# Patient Record
Sex: MALE | Race: WHITE | Employment: FULL TIME | URBAN - METROPOLITAN AREA
[De-identification: names, ages, dates, MRNs, and addresses within clinical notes are randomized per-mention and may not be internally consistent; named-entity substitution may affect disease eponyms.]

---

## 2019-08-10 ENCOUNTER — HOSPITAL ENCOUNTER (INPATIENT)
Age: 29
LOS: 6 days | Discharge: HOME | DRG: 710 | End: 2019-08-16
Attending: EMERGENCY MEDICINE | Admitting: INTERNAL MEDICINE
Payer: MEDICARE

## 2019-08-10 ENCOUNTER — APPOINTMENT (OUTPATIENT)
Dept: CT IMAGING | Age: 29
DRG: 710 | End: 2019-08-10
Payer: MEDICARE

## 2019-08-10 ENCOUNTER — APPOINTMENT (OUTPATIENT)
Dept: GENERAL RADIOLOGY | Age: 29
DRG: 710 | End: 2019-08-10
Payer: MEDICARE

## 2019-08-10 DIAGNOSIS — L03.114 CELLULITIS OF LEFT UPPER EXTREMITY: Primary | ICD-10-CM

## 2019-08-10 PROBLEM — L02.519 HAND ABSCESS: Status: ACTIVE | Noted: 2019-08-10

## 2019-08-10 LAB
A/G RATIO: 0.9 (ref 1.1–2.2)
ALBUMIN SERPL-MCNC: 4.1 G/DL (ref 3.4–5)
ALP BLD-CCNC: 52 U/L (ref 40–129)
ALT SERPL-CCNC: 73 U/L (ref 10–40)
ANION GAP SERPL CALCULATED.3IONS-SCNC: 15 MMOL/L (ref 3–16)
AST SERPL-CCNC: 155 U/L (ref 15–37)
BASOPHILS ABSOLUTE: 0 K/UL (ref 0–0.2)
BASOPHILS RELATIVE PERCENT: 0.3 %
BILIRUB SERPL-MCNC: 0.9 MG/DL (ref 0–1)
BUN BLDV-MCNC: 8 MG/DL (ref 7–20)
C-REACTIVE PROTEIN: 56.7 MG/L (ref 0–5.1)
CALCIUM SERPL-MCNC: 9 MG/DL (ref 8.3–10.6)
CHLORIDE BLD-SCNC: 93 MMOL/L (ref 99–110)
CO2: 26 MMOL/L (ref 21–32)
CREAT SERPL-MCNC: 0.8 MG/DL (ref 0.9–1.3)
EOSINOPHILS ABSOLUTE: 0 K/UL (ref 0–0.6)
EOSINOPHILS RELATIVE PERCENT: 0.1 %
GFR AFRICAN AMERICAN: >60
GFR NON-AFRICAN AMERICAN: >60
GLOBULIN: 4.4 G/DL
GLUCOSE BLD-MCNC: 95 MG/DL (ref 70–99)
HCT VFR BLD CALC: 40.7 % (ref 40.5–52.5)
HEMOGLOBIN: 13.7 G/DL (ref 13.5–17.5)
LACTIC ACID: 1.7 MMOL/L (ref 0.4–2)
LYMPHOCYTES ABSOLUTE: 0.9 K/UL (ref 1–5.1)
LYMPHOCYTES RELATIVE PERCENT: 13.4 %
MCH RBC QN AUTO: 28.7 PG (ref 26–34)
MCHC RBC AUTO-ENTMCNC: 33.7 G/DL (ref 31–36)
MCV RBC AUTO: 85.1 FL (ref 80–100)
MONOCYTES ABSOLUTE: 0.7 K/UL (ref 0–1.3)
MONOCYTES RELATIVE PERCENT: 10.4 %
NEUTROPHILS ABSOLUTE: 5.1 K/UL (ref 1.7–7.7)
NEUTROPHILS RELATIVE PERCENT: 75.8 %
PDW BLD-RTO: 14.2 % (ref 12.4–15.4)
PLATELET # BLD: 163 K/UL (ref 135–450)
PMV BLD AUTO: 9.4 FL (ref 5–10.5)
POTASSIUM REFLEX MAGNESIUM: 4 MMOL/L (ref 3.5–5.1)
RBC # BLD: 4.79 M/UL (ref 4.2–5.9)
SEDIMENTATION RATE, ERYTHROCYTE: 20 MM/HR (ref 0–15)
SODIUM BLD-SCNC: 134 MMOL/L (ref 136–145)
TOTAL PROTEIN: 8.5 G/DL (ref 6.4–8.2)
TROPONIN: <0.01 NG/ML
WBC # BLD: 6.7 K/UL (ref 4–11)

## 2019-08-10 PROCEDURE — 6360000002 HC RX W HCPCS: Performed by: INTERNAL MEDICINE

## 2019-08-10 PROCEDURE — 73200 CT UPPER EXTREMITY W/O DYE: CPT

## 2019-08-10 PROCEDURE — 87040 BLOOD CULTURE FOR BACTERIA: CPT

## 2019-08-10 PROCEDURE — 2580000003 HC RX 258: Performed by: INTERNAL MEDICINE

## 2019-08-10 PROCEDURE — 87801 DETECT AGNT MULT DNA AMPLI: CPT

## 2019-08-10 PROCEDURE — 2580000003 HC RX 258: Performed by: EMERGENCY MEDICINE

## 2019-08-10 PROCEDURE — 6370000000 HC RX 637 (ALT 250 FOR IP): Performed by: ORTHOPAEDIC SURGERY

## 2019-08-10 PROCEDURE — 96375 TX/PRO/DX INJ NEW DRUG ADDON: CPT

## 2019-08-10 PROCEDURE — 83605 ASSAY OF LACTIC ACID: CPT

## 2019-08-10 PROCEDURE — 86140 C-REACTIVE PROTEIN: CPT

## 2019-08-10 PROCEDURE — 6360000002 HC RX W HCPCS: Performed by: EMERGENCY MEDICINE

## 2019-08-10 PROCEDURE — 96372 THER/PROPH/DIAG INJ SC/IM: CPT

## 2019-08-10 PROCEDURE — 99284 EMERGENCY DEPT VISIT MOD MDM: CPT

## 2019-08-10 PROCEDURE — 80053 COMPREHEN METABOLIC PANEL: CPT

## 2019-08-10 PROCEDURE — 85025 COMPLETE CBC W/AUTO DIFF WBC: CPT

## 2019-08-10 PROCEDURE — 1200000000 HC SEMI PRIVATE

## 2019-08-10 PROCEDURE — 87641 MR-STAPH DNA AMP PROBE: CPT

## 2019-08-10 PROCEDURE — 99255 IP/OBS CONSLTJ NEW/EST HI 80: CPT | Performed by: INTERNAL MEDICINE

## 2019-08-10 PROCEDURE — 85652 RBC SED RATE AUTOMATED: CPT

## 2019-08-10 PROCEDURE — 6370000000 HC RX 637 (ALT 250 FOR IP): Performed by: EMERGENCY MEDICINE

## 2019-08-10 PROCEDURE — 0J9K0ZZ DRAINAGE OF LEFT HAND SUBCUTANEOUS TISSUE AND FASCIA, OPEN APPROACH: ICD-10-PCS | Performed by: ORTHOPAEDIC SURGERY

## 2019-08-10 PROCEDURE — 84484 ASSAY OF TROPONIN QUANT: CPT

## 2019-08-10 PROCEDURE — 96361 HYDRATE IV INFUSION ADD-ON: CPT

## 2019-08-10 PROCEDURE — 73130 X-RAY EXAM OF HAND: CPT

## 2019-08-10 PROCEDURE — 6360000002 HC RX W HCPCS: Performed by: ORTHOPAEDIC SURGERY

## 2019-08-10 PROCEDURE — 71046 X-RAY EXAM CHEST 2 VIEWS: CPT

## 2019-08-10 PROCEDURE — 36415 COLL VENOUS BLD VENIPUNCTURE: CPT

## 2019-08-10 PROCEDURE — 96365 THER/PROPH/DIAG IV INF INIT: CPT

## 2019-08-10 RX ORDER — BUPRENORPHINE HYDROCHLORIDE AND NALOXONE HYDROCHLORIDE DIHYDRATE 8; 2 MG/1; MG/1
1 TABLET SUBLINGUAL DAILY
COMMUNITY

## 2019-08-10 RX ORDER — BUPRENORPHINE HYDROCHLORIDE AND NALOXONE HYDROCHLORIDE DIHYDRATE 8; 2 MG/1; MG/1
1 TABLET SUBLINGUAL DAILY
Status: DISCONTINUED | OUTPATIENT
Start: 2019-08-10 | End: 2019-08-10

## 2019-08-10 RX ORDER — IBUPROFEN 600 MG/1
600 TABLET ORAL ONCE
Status: DISCONTINUED | OUTPATIENT
Start: 2019-08-10 | End: 2019-08-11 | Stop reason: ALTCHOICE

## 2019-08-10 RX ORDER — ONDANSETRON 2 MG/ML
4 INJECTION INTRAMUSCULAR; INTRAVENOUS EVERY 6 HOURS PRN
Status: DISCONTINUED | OUTPATIENT
Start: 2019-08-10 | End: 2019-08-11

## 2019-08-10 RX ORDER — SODIUM CHLORIDE 0.9 % (FLUSH) 0.9 %
10 SYRINGE (ML) INJECTION EVERY 12 HOURS SCHEDULED
Status: DISCONTINUED | OUTPATIENT
Start: 2019-08-10 | End: 2019-08-16 | Stop reason: HOSPADM

## 2019-08-10 RX ORDER — SODIUM CHLORIDE 0.9 % (FLUSH) 0.9 %
10 SYRINGE (ML) INJECTION PRN
Status: DISCONTINUED | OUTPATIENT
Start: 2019-08-10 | End: 2019-08-16 | Stop reason: HOSPADM

## 2019-08-10 RX ORDER — 0.9 % SODIUM CHLORIDE 0.9 %
1000 INTRAVENOUS SOLUTION INTRAVENOUS ONCE
Status: COMPLETED | OUTPATIENT
Start: 2019-08-10 | End: 2019-08-10

## 2019-08-10 RX ORDER — 0.9 % SODIUM CHLORIDE 0.9 %
1000 INTRAVENOUS SOLUTION INTRAVENOUS ONCE
Status: DISCONTINUED | OUTPATIENT
Start: 2019-08-10 | End: 2019-08-11

## 2019-08-10 RX ORDER — SODIUM CHLORIDE 9 MG/ML
INJECTION, SOLUTION INTRAVENOUS CONTINUOUS
Status: DISCONTINUED | OUTPATIENT
Start: 2019-08-10 | End: 2019-08-13

## 2019-08-10 RX ORDER — KETOROLAC TROMETHAMINE 30 MG/ML
30 INJECTION, SOLUTION INTRAMUSCULAR; INTRAVENOUS EVERY 6 HOURS PRN
Status: DISPENSED | OUTPATIENT
Start: 2019-08-10 | End: 2019-08-15

## 2019-08-10 RX ORDER — NALOXONE HYDROCHLORIDE 1 MG/ML
1 INJECTION INTRAMUSCULAR; INTRAVENOUS; SUBCUTANEOUS PRN
Status: DISCONTINUED | OUTPATIENT
Start: 2019-08-10 | End: 2019-08-11 | Stop reason: ALTCHOICE

## 2019-08-10 RX ORDER — ONDANSETRON 2 MG/ML
4 INJECTION INTRAMUSCULAR; INTRAVENOUS ONCE
Status: DISCONTINUED | OUTPATIENT
Start: 2019-08-10 | End: 2019-08-11 | Stop reason: ALTCHOICE

## 2019-08-10 RX ORDER — CHLORHEXIDINE GLUCONATE 4 G/100ML
SOLUTION TOPICAL ONCE
Status: COMPLETED | OUTPATIENT
Start: 2019-08-10 | End: 2019-08-10

## 2019-08-10 RX ORDER — SODIUM CHLORIDE 9 MG/ML
INJECTION, SOLUTION INTRAVENOUS CONTINUOUS
Status: DISCONTINUED | OUTPATIENT
Start: 2019-08-11 | End: 2019-08-10

## 2019-08-10 RX ORDER — BUPRENORPHINE AND NALOXONE 8; 2 MG/1; MG/1
1 FILM, SOLUBLE BUCCAL; SUBLINGUAL DAILY
Status: DISCONTINUED | OUTPATIENT
Start: 2019-08-10 | End: 2019-08-16 | Stop reason: HOSPADM

## 2019-08-10 RX ORDER — IBUPROFEN 600 MG/1
600 TABLET ORAL ONCE
Status: COMPLETED | OUTPATIENT
Start: 2019-08-10 | End: 2019-08-10

## 2019-08-10 RX ADMIN — CEFEPIME HYDROCHLORIDE 2 G: 2 INJECTION, POWDER, FOR SOLUTION INTRAVENOUS at 13:05

## 2019-08-10 RX ADMIN — KETOROLAC TROMETHAMINE 30 MG: 30 INJECTION, SOLUTION INTRAMUSCULAR at 22:19

## 2019-08-10 RX ADMIN — VANCOMYCIN HYDROCHLORIDE 1000 MG: 1 INJECTION, POWDER, LYOPHILIZED, FOR SOLUTION INTRAVENOUS at 16:45

## 2019-08-10 RX ADMIN — PIPERACILLIN SODIUM,TAZOBACTAM SODIUM 3.38 G: 3; .375 INJECTION, POWDER, FOR SOLUTION INTRAVENOUS at 05:12

## 2019-08-10 RX ADMIN — IBUPROFEN 600 MG: 600 TABLET ORAL at 02:38

## 2019-08-10 RX ADMIN — BUPRENORPHINE HYDROCHLORIDE, NALOXONE HYDROCHLORIDE 1 FILM: 8; 2 FILM, SOLUBLE BUCCAL; SUBLINGUAL at 14:09

## 2019-08-10 RX ADMIN — SODIUM CHLORIDE: 9 INJECTION, SOLUTION INTRAVENOUS at 10:30

## 2019-08-10 RX ADMIN — VANCOMYCIN HYDROCHLORIDE 1000 MG: 1 INJECTION, POWDER, LYOPHILIZED, FOR SOLUTION INTRAVENOUS at 05:44

## 2019-08-10 RX ADMIN — ANTISEPTIC SURGICAL SCRUB: 0.04 SOLUTION TOPICAL at 12:54

## 2019-08-10 RX ADMIN — SODIUM CHLORIDE 1000 ML: 9 INJECTION, SOLUTION INTRAVENOUS at 04:03

## 2019-08-10 RX ADMIN — NALOXONE HYDROCHLORIDE 1 MG: 1 INJECTION PARENTERAL at 03:30

## 2019-08-10 RX ADMIN — AMPICILLIN AND SULBACTAM 3 G: 1; .5 INJECTION, POWDER, FOR SOLUTION INTRAMUSCULAR; INTRAVENOUS at 09:51

## 2019-08-10 ASSESSMENT — ENCOUNTER SYMPTOMS
SORE THROAT: 0
ABDOMINAL PAIN: 0
NAUSEA: 0
CONSTIPATION: 0
WHEEZING: 0
BACK PAIN: 0
TROUBLE SWALLOWING: 0
DIARRHEA: 0
EYE REDNESS: 0
SHORTNESS OF BREATH: 0
COUGH: 0
RHINORRHEA: 0
EYE DISCHARGE: 0

## 2019-08-10 ASSESSMENT — PAIN DESCRIPTION - PAIN TYPE: TYPE: ACUTE PAIN

## 2019-08-10 ASSESSMENT — PAIN SCALES - GENERAL
PAINLEVEL_OUTOF10: 0
PAINLEVEL_OUTOF10: 8
PAINLEVEL_OUTOF10: 7
PAINLEVEL_OUTOF10: 8

## 2019-08-10 ASSESSMENT — PAIN DESCRIPTION - DESCRIPTORS: DESCRIPTORS: ACHING

## 2019-08-10 ASSESSMENT — PAIN DESCRIPTION - LOCATION: LOCATION: HAND

## 2019-08-10 ASSESSMENT — PAIN DESCRIPTION - ORIENTATION: ORIENTATION: LEFT

## 2019-08-10 NOTE — PROGRESS NOTES
Consent for left hand abscess I&D signed by patient and placed in chart. Educated on NPO status at midnight.   Electronically signed by Ryanne Lange RN on 8/10/2019 at 4:23 PM
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
## 2019-08-10 NOTE — PROGRESS NOTES
In to assess pt. Very drowsy, arouses to name but falls right back asleep. VSS. Does not appear to be in any distress. Left hand swollen, no active drainage from \"spider bite\". Warm to touch, normal radial pulse, brisk cap refill, normal sensation. Rt ac IV WDL. Call light within reach, will continue to monitor.   Electronically signed by Doreen Moreland RN on 8/10/2019 at 10:55 AM

## 2019-08-10 NOTE — PROGRESS NOTES
COWS assessment negative this AM. Pt sleeping quietly in bed all day but wakes to name. No signs or symptoms of withdrawal. Will continue to monitor.   Electronically signed by Bhavani Moncada RN on 8/10/2019 at 6:55 PM

## 2019-08-10 NOTE — ED PROVIDER NOTES
Occupational History    None   Social Needs    Financial resource strain: None    Food insecurity:     Worry: None     Inability: None    Transportation needs:     Medical: None     Non-medical: None   Tobacco Use    Smoking status: Current Every Day Smoker     Packs/day: 0.50     Years: 5.00     Pack years: 2.50     Types: Cigarettes   Substance and Sexual Activity    Alcohol use: Yes     Alcohol/week: 1.0 standard drinks     Types: 1 Shots of liquor per week    Drug use: Yes    Sexual activity: Yes   Lifestyle    Physical activity:     Days per week: None     Minutes per session: None    Stress: None   Relationships    Social connections:     Talks on phone: None     Gets together: None     Attends Scientologist service: None     Active member of club or organization: None     Attends meetings of clubs or organizations: None     Relationship status: None    Intimate partner violence:     Fear of current or ex partner: None     Emotionally abused: None     Physically abused: None     Forced sexual activity: None   Other Topics Concern    None   Social History Narrative    None       SCREENINGS      @FLOW([id number])@      PHYSICAL EXAM    (up to 7 for level 4, 8 or more for level 5)     ED Triage Vitals [08/10/19 0206]   BP Temp Temp src Pulse Resp SpO2 Height Weight   (!) 140/72 101.3 °F (38.5 °C) -- 94 18 98 % -- 150 lb (68 kg)       Physical Exam   Constitutional: He is oriented to person, place, and time. He appears well-developed and well-nourished. No distress. HENT:   Head: Normocephalic and atraumatic. Eyes: EOM are normal. No scleral icterus. Neck: Normal range of motion. No tracheal deviation present. Pulmonary/Chest: Effort normal. No respiratory distress. Abdominal: Soft. He exhibits no distension. Musculoskeletal: He exhibits tenderness. He exhibits no edema or deformity. Abscess to left dorsum of hand. Neurological: He is alert and oriented to person, place, and time.
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
## 2019-08-10 NOTE — CONSULTS
infusion, , Intravenous, Continuous  ketorolac (TORADOL) injection 30 mg, 30 mg, Intravenous, Q6H PRN  Allergies:  Tylenol [acetaminophen]    Social History:   TOBACCO:   reports that he has been smoking cigarettes. He has a 2.50 pack-year smoking history. He does not have any smokeless tobacco history on file. ETOH:   reports that he drinks about 1.0 standard drinks of alcohol per week. DRUGS:   reports that he has current or past drug history. Family History:   History reviewed. No pertinent family history. PHYSICAL EXAM:    VITALS:  /78   Pulse 88   Temp 98.6 °F (37 °C) (Oral)   Resp 16   Wt 150 lb (68 kg)   SpO2 96%   BMI 24.21 kg/m²   24HR INTAKE/OUTPUT:  No intake or output data in the 24 hours ending 08/10/19 1135  CONSTITUTIONAL:  Awakens briefly to voice, no acute distress. Only answers \"uh-huh\" to questions. Goes right back to sleep  ENT:  normocepalic, without obvious abnormality, atraumatic  LUNGS:  no increased work of breathing  Left hand: swollen and painful with attempt at finger / wrist ROM. Pustule noted over 2rd MC area dorsally, mild lymphangitic streaking proximal to the wrist.  Area is warm to touch. Capillary refill < 2 seconds. Media Information      Document Information     Wound   Left hand   08/10/2019 11:44   Attached To:    Hospital Encounter on 8/10/19   Source Information     Brandi Salinas MD  [facility]         DATA:    CBC:   Lab Results   Component Value Date    WBC 6.7 08/10/2019    RBC 4.79 08/10/2019    HGB 13.7 08/10/2019    HCT 40.7 08/10/2019    MCV 85.1 08/10/2019    MCH 28.7 08/10/2019    MCHC 33.7 08/10/2019    RDW 14.2 08/10/2019     08/10/2019    MPV 9.4 08/10/2019     BMP:    Lab Results   Component Value Date     08/10/2019    K 4.0 08/10/2019    CL 93 08/10/2019    CO2 26 08/10/2019    BUN 8 08/10/2019    LABALBU 4.1 08/10/2019    CREATININE 0.8 08/10/2019    CALCIUM 9.0 08/10/2019    GFRAA >60 08/10/2019

## 2019-08-11 ENCOUNTER — ANESTHESIA (OUTPATIENT)
Dept: OPERATING ROOM | Age: 29
DRG: 710 | End: 2019-08-11
Payer: MEDICARE

## 2019-08-11 ENCOUNTER — ANESTHESIA EVENT (OUTPATIENT)
Dept: OPERATING ROOM | Age: 29
DRG: 710 | End: 2019-08-11
Payer: MEDICARE

## 2019-08-11 VITALS
SYSTOLIC BLOOD PRESSURE: 111 MMHG | RESPIRATION RATE: 14 BRPM | OXYGEN SATURATION: 98 % | DIASTOLIC BLOOD PRESSURE: 55 MMHG

## 2019-08-11 LAB
AMPHETAMINE SCREEN, URINE: POSITIVE
BARBITURATE SCREEN URINE: ABNORMAL
BENZODIAZEPINE SCREEN, URINE: POSITIVE
BILIRUBIN URINE: NEGATIVE
BLOOD, URINE: NEGATIVE
CANNABINOID SCREEN URINE: POSITIVE
CLARITY: CLEAR
COCAINE METABOLITE SCREEN URINE: POSITIVE
COLOR: ABNORMAL
GLUCOSE URINE: NEGATIVE MG/DL
KETONES, URINE: NEGATIVE MG/DL
LEUKOCYTE ESTERASE, URINE: NEGATIVE
Lab: ABNORMAL
METHADONE SCREEN, URINE: ABNORMAL
MICROSCOPIC EXAMINATION: ABNORMAL
MRSA SCREEN RT-PCR: ABNORMAL
NITRITE, URINE: NEGATIVE
OPIATE SCREEN URINE: ABNORMAL
ORGANISM: ABNORMAL
OXYCODONE URINE: ABNORMAL
PH UA: 6
PH UA: 6.5 (ref 5–8)
PHENCYCLIDINE SCREEN URINE: ABNORMAL
PROPOXYPHENE SCREEN: ABNORMAL
PROTEIN UA: NEGATIVE MG/DL
SPECIFIC GRAVITY UA: 1.02 (ref 1–1.03)
URINE REFLEX TO CULTURE: ABNORMAL
URINE TYPE: ABNORMAL
UROBILINOGEN, URINE: >=8 E.U./DL

## 2019-08-11 PROCEDURE — 6360000002 HC RX W HCPCS: Performed by: INTERNAL MEDICINE

## 2019-08-11 PROCEDURE — 2580000003 HC RX 258: Performed by: ORTHOPAEDIC SURGERY

## 2019-08-11 PROCEDURE — 3600000012 HC SURGERY LEVEL 2 ADDTL 15MIN: Performed by: ORTHOPAEDIC SURGERY

## 2019-08-11 PROCEDURE — 3700000000 HC ANESTHESIA ATTENDED CARE: Performed by: ORTHOPAEDIC SURGERY

## 2019-08-11 PROCEDURE — 87075 CULTR BACTERIA EXCEPT BLOOD: CPT

## 2019-08-11 PROCEDURE — 1200000000 HC SEMI PRIVATE

## 2019-08-11 PROCEDURE — 2580000003 HC RX 258: Performed by: ANESTHESIOLOGY

## 2019-08-11 PROCEDURE — 81003 URINALYSIS AUTO W/O SCOPE: CPT

## 2019-08-11 PROCEDURE — 80307 DRUG TEST PRSMV CHEM ANLYZR: CPT

## 2019-08-11 PROCEDURE — 6360000002 HC RX W HCPCS: Performed by: ORTHOPAEDIC SURGERY

## 2019-08-11 PROCEDURE — 99233 SBSQ HOSP IP/OBS HIGH 50: CPT | Performed by: INTERNAL MEDICINE

## 2019-08-11 PROCEDURE — 3700000001 HC ADD 15 MINUTES (ANESTHESIA): Performed by: ORTHOPAEDIC SURGERY

## 2019-08-11 PROCEDURE — 2580000003 HC RX 258: Performed by: INTERNAL MEDICINE

## 2019-08-11 PROCEDURE — 7100000000 HC PACU RECOVERY - FIRST 15 MIN: Performed by: ORTHOPAEDIC SURGERY

## 2019-08-11 PROCEDURE — 6360000002 HC RX W HCPCS: Performed by: ANESTHESIOLOGY

## 2019-08-11 PROCEDURE — 87070 CULTURE OTHR SPECIMN AEROBIC: CPT

## 2019-08-11 PROCEDURE — 87205 SMEAR GRAM STAIN: CPT

## 2019-08-11 PROCEDURE — 7100000001 HC PACU RECOVERY - ADDTL 15 MIN: Performed by: ORTHOPAEDIC SURGERY

## 2019-08-11 PROCEDURE — 2709999900 HC NON-CHARGEABLE SUPPLY: Performed by: ORTHOPAEDIC SURGERY

## 2019-08-11 PROCEDURE — 3600000002 HC SURGERY LEVEL 2 BASE: Performed by: ORTHOPAEDIC SURGERY

## 2019-08-11 RX ORDER — ONDANSETRON 2 MG/ML
4 INJECTION INTRAMUSCULAR; INTRAVENOUS
Status: DISCONTINUED | OUTPATIENT
Start: 2019-08-11 | End: 2019-08-11 | Stop reason: HOSPADM

## 2019-08-11 RX ORDER — PROPOFOL 10 MG/ML
INJECTION, EMULSION INTRAVENOUS PRN
Status: DISCONTINUED | OUTPATIENT
Start: 2019-08-11 | End: 2019-08-11 | Stop reason: SDUPTHER

## 2019-08-11 RX ORDER — LIDOCAINE HYDROCHLORIDE 20 MG/ML
INJECTION, SOLUTION INTRAVENOUS PRN
Status: DISCONTINUED | OUTPATIENT
Start: 2019-08-11 | End: 2019-08-11 | Stop reason: SDUPTHER

## 2019-08-11 RX ORDER — SODIUM CHLORIDE 0.9 % (FLUSH) 0.9 %
10 SYRINGE (ML) INJECTION EVERY 12 HOURS SCHEDULED
Status: CANCELLED | OUTPATIENT
Start: 2019-08-11

## 2019-08-11 RX ORDER — OXYCODONE HYDROCHLORIDE AND ACETAMINOPHEN 5; 325 MG/1; MG/1
2 TABLET ORAL PRN
Status: DISCONTINUED | OUTPATIENT
Start: 2019-08-11 | End: 2019-08-11 | Stop reason: HOSPADM

## 2019-08-11 RX ORDER — SODIUM CHLORIDE 9 MG/ML
INJECTION, SOLUTION INTRAVENOUS CONTINUOUS
Status: CANCELLED | OUTPATIENT
Start: 2019-08-11

## 2019-08-11 RX ORDER — SODIUM CHLORIDE 9 MG/ML
INJECTION, SOLUTION INTRAVENOUS CONTINUOUS PRN
Status: DISCONTINUED | OUTPATIENT
Start: 2019-08-11 | End: 2019-08-11 | Stop reason: SDUPTHER

## 2019-08-11 RX ORDER — FENTANYL CITRATE 50 UG/ML
25 INJECTION, SOLUTION INTRAMUSCULAR; INTRAVENOUS EVERY 5 MIN PRN
Status: DISCONTINUED | OUTPATIENT
Start: 2019-08-11 | End: 2019-08-11 | Stop reason: HOSPADM

## 2019-08-11 RX ORDER — MIDAZOLAM HYDROCHLORIDE 1 MG/ML
INJECTION INTRAMUSCULAR; INTRAVENOUS PRN
Status: DISCONTINUED | OUTPATIENT
Start: 2019-08-11 | End: 2019-08-11 | Stop reason: SDUPTHER

## 2019-08-11 RX ORDER — NICOTINE 21 MG/24HR
1 PATCH, TRANSDERMAL 24 HOURS TRANSDERMAL DAILY
Status: DISCONTINUED | OUTPATIENT
Start: 2019-08-12 | End: 2019-08-16 | Stop reason: HOSPADM

## 2019-08-11 RX ORDER — FENTANYL CITRATE 50 UG/ML
INJECTION, SOLUTION INTRAMUSCULAR; INTRAVENOUS PRN
Status: DISCONTINUED | OUTPATIENT
Start: 2019-08-11 | End: 2019-08-11 | Stop reason: SDUPTHER

## 2019-08-11 RX ORDER — SODIUM CHLORIDE 0.9 % (FLUSH) 0.9 %
10 SYRINGE (ML) INJECTION PRN
Status: CANCELLED | OUTPATIENT
Start: 2019-08-11

## 2019-08-11 RX ORDER — OXYCODONE HYDROCHLORIDE AND ACETAMINOPHEN 5; 325 MG/1; MG/1
1 TABLET ORAL PRN
Status: DISCONTINUED | OUTPATIENT
Start: 2019-08-11 | End: 2019-08-11 | Stop reason: HOSPADM

## 2019-08-11 RX ADMIN — PROPOFOL 200 MG: 10 INJECTION, EMULSION INTRAVENOUS at 10:46

## 2019-08-11 RX ADMIN — SODIUM CHLORIDE: 9 INJECTION, SOLUTION INTRAVENOUS at 13:29

## 2019-08-11 RX ADMIN — LIDOCAINE HYDROCHLORIDE 50 MG: 20 INJECTION, SOLUTION INTRAVENOUS at 10:46

## 2019-08-11 RX ADMIN — BUPRENORPHINE HYDROCHLORIDE, NALOXONE HYDROCHLORIDE 1 FILM: 8; 2 FILM, SOLUBLE BUCCAL; SUBLINGUAL at 08:29

## 2019-08-11 RX ADMIN — FENTANYL CITRATE 50 MCG: 50 INJECTION INTRAMUSCULAR; INTRAVENOUS at 10:57

## 2019-08-11 RX ADMIN — CEFEPIME HYDROCHLORIDE 2 G: 2 INJECTION, POWDER, FOR SOLUTION INTRAVENOUS at 00:54

## 2019-08-11 RX ADMIN — CEFEPIME HYDROCHLORIDE 2 G: 2 INJECTION, POWDER, FOR SOLUTION INTRAVENOUS at 13:29

## 2019-08-11 RX ADMIN — VANCOMYCIN HYDROCHLORIDE 1000 MG: 1 INJECTION, POWDER, LYOPHILIZED, FOR SOLUTION INTRAVENOUS at 19:42

## 2019-08-11 RX ADMIN — VANCOMYCIN HYDROCHLORIDE 1000 MG: 1 INJECTION, POWDER, LYOPHILIZED, FOR SOLUTION INTRAVENOUS at 04:48

## 2019-08-11 RX ADMIN — KETOROLAC TROMETHAMINE 30 MG: 30 INJECTION, SOLUTION INTRAMUSCULAR at 19:42

## 2019-08-11 RX ADMIN — SODIUM CHLORIDE: 9 INJECTION, SOLUTION INTRAVENOUS at 11:00

## 2019-08-11 RX ADMIN — FENTANYL CITRATE 50 MCG: 50 INJECTION INTRAMUSCULAR; INTRAVENOUS at 10:46

## 2019-08-11 RX ADMIN — ONDANSETRON 4 MG: 2 INJECTION INTRAMUSCULAR; INTRAVENOUS at 10:50

## 2019-08-11 RX ADMIN — MIDAZOLAM 2 MG: 1 INJECTION INTRAMUSCULAR; INTRAVENOUS at 10:46

## 2019-08-11 ASSESSMENT — PULMONARY FUNCTION TESTS
PIF_VALUE: 5
PIF_VALUE: 4
PIF_VALUE: 0
PIF_VALUE: 8
PIF_VALUE: 1
PIF_VALUE: 3
PIF_VALUE: 5
PIF_VALUE: 3
PIF_VALUE: 0
PIF_VALUE: 0
PIF_VALUE: 4
PIF_VALUE: 3
PIF_VALUE: 3
PIF_VALUE: 8
PIF_VALUE: 6
PIF_VALUE: 34
PIF_VALUE: 5
PIF_VALUE: 6
PIF_VALUE: 7
PIF_VALUE: 0
PIF_VALUE: 3
PIF_VALUE: 7
PIF_VALUE: 4
PIF_VALUE: 1
PIF_VALUE: 3
PIF_VALUE: 4
PIF_VALUE: 3
PIF_VALUE: 1

## 2019-08-11 ASSESSMENT — PAIN SCALES - GENERAL
PAINLEVEL_OUTOF10: 7
PAINLEVEL_OUTOF10: 0

## 2019-08-11 NOTE — OP NOTE
[address]                                OPERATIVE REPORT    PATIENT NAME: Melvi Black                       :        1990  MED REC NO:   [id number]                          ROOM:       9611  ACCOUNT NO:   [de-identified]                           ADMIT DATE: 08/10/2019  PROVIDER:     Criselda Stratton MD    DATE OF PROCEDURE:  2019    PREOPERATIVE DIAGNOSIS:  Left hand dorsal abscess. POSTOPERATIVE DIAGNOSIS:  Left hand dorsal abscess. OPERATION PERFORMED:  Incision and drainage with debridement of skin and  subcutaneous tissue, left dorsal hand. SURGEON:  Criselda Stratton MD    ANESTHESIA:  General endotracheal anesthesia. ESTIMATED BLOOD LOSS:  Less than 50 mL. SPECIMEN:  Swab culture for aerobic and anaerobic assay. DRAINS:  None. INTRAOPERATIVE COMPLICATIONS:  None apparent. INDICATIONS:  The patient is a 79-year-old male who developed swelling  and pain in the dorsal aspect of his left hand with an area that he had  tried to pop at home. The swelling continued to reaccumulate and he  states that this was from a \"spider bite. \"  He does have underlying  history of hepatitis C, HIV disease, and substance use with last  reported heroin use a little bit more than 48 hours ago. After  reviewing the findings of his CT scan and persistent symptoms despite IV  antibiotics, we have elected to proceed with open incision and drainage. He understands the nature of the surgery as well as the risks, benefits,  potential complications to his best of his ability. He was prepared to  proceed and a consent was obtained. All questions were answered to his  satisfaction. OPERATIVE PROCEDURE:  The patient was identified in the preoperative  holding area, and the correct side of his left hand was marked.   He was  brought to the operating room, placed on the table in supine position,  and

## 2019-08-11 NOTE — BRIEF OP NOTE
Brief Postoperative Note  ______________________________________________________________    Patient: Aileen Mullins  YOB: 1990  MRN: [id number]  Date of Procedure: 8/11/2019    Pre-Op Diagnosis: UNKNOWN    Post-Op Diagnosis: Same       Procedure(s):  LEFT HAND DEBRIDEMENT INCISION AND DRAINAGE    Anesthesia: General    Surgeon(s):  Gerardo Mg MD    Assistant: Monroe Gracia    Estimated Blood Loss (mL): less than 50     Complications: None    Specimens:   ID Type Source Tests Collected by Time Destination   1 : Swab left hand abscess Tissue Tissue TISSUE CULTURE Gerardo Mg MD 8/11/2019 1053        Implants:     * No implants in log *      Drains: * No LDAs found *    Findings: 5 - 10 cc of pus expressed from dorsal left hand incision at the site of the head of his abscess.     Deidra Logan MD  Date: 8/11/2019  Time: 11:04 AM

## 2019-08-11 NOTE — PROGRESS NOTES
RN rounded on pt and updated white board. Pt sleeping in bed at this time. Will continue to monitor.
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
## 2019-08-11 NOTE — PROGRESS NOTES
Pt remains asleep, arouses easily to voice. Pt shakes head \"no\" when asked if having pain. RR easy on room air. Dressing clean dry and intact, fingers have slight redness noted. Able to move all four extremities. Continue to monitor.

## 2019-08-11 NOTE — PROGRESS NOTES
RN educated pt multiple times about the use of the urinal.  When asked if pt had urinated pt stated yes, and that he used the urinal but dumped it. RN once again educated pt not to dump the urinal that we needed a urine sample. Pt verbalized his understanding and said he would not dump it again.

## 2019-08-11 NOTE — PROGRESS NOTES
Pt to PACU from OR. VSS. Santiago Holden. Pt asleep. RR easy on 4 L of O2 nasal canula. Dressing to left hand clean dry and intact. Fingers pink and warm. Cap refill less than 3. Continue to monitor.

## 2019-08-11 NOTE — ANESTHESIA PRE PROCEDURE
Anesthesia Evaluation  Patient summary reviewed no history of anesthetic complications:   Airway: Mallampati: II  TM distance: >3 FB   Neck ROM: full  Mouth opening: > = 3 FB Dental:          Pulmonary:                              Cardiovascular:Negative CV ROS                      Neuro/Psych:      (-) seizures, TIA and CVA           GI/Hepatic/Renal:   (+) hepatitis: C, liver disease:,          ROS comment: HIV, Hep C. Medical noncompliance. Heroin abuse/dependence. .   Endo/Other:    (+) blood dyscrasia::., electrolyte abnormalities, no malignancy/cancer. (-) diabetes mellitus, hypothyroidism, hyperthyroidism, no malignancy/cancer                ROS comment: Elevated inflammatory markers Abdominal:           Vascular:     - PVD, DVT and PE. Anesthesia Plan      general     ASA 4 - emergent       Induction: intravenous. MIPS: Prophylactic antiemetics administered. Anesthetic plan and risks discussed with patient. Plan discussed with CRNA. Attending anesthesiologist reviewed and agrees with Pre Eval content      IVDU, immunocompromised, abscess in hand. This pre-anesthesia assessment may be used as a history and physical.    DOS STAFF ADDENDUM:    Pt seen and examined, chart reviewed (including anesthesia, drug and allergy history). No interval changes to history and physical examination. Anesthetic plan, risks, benefits, alternatives, and personnel involved discussed with patient. Patient verbalized an understanding and agrees to proceed.       Sharyn Farfan MD  August 11, 2019  9:54 AM

## 2019-08-12 LAB
CREAT SERPL-MCNC: 0.6 MG/DL (ref 0.9–1.3)
GFR AFRICAN AMERICAN: >60
GFR NON-AFRICAN AMERICAN: >60
VANCOMYCIN TROUGH: 4.9 UG/ML (ref 10–20)

## 2019-08-12 PROCEDURE — 6370000000 HC RX 637 (ALT 250 FOR IP): Performed by: INTERNAL MEDICINE

## 2019-08-12 PROCEDURE — 2580000003 HC RX 258: Performed by: ORTHOPAEDIC SURGERY

## 2019-08-12 PROCEDURE — 6370000000 HC RX 637 (ALT 250 FOR IP): Performed by: NURSE PRACTITIONER

## 2019-08-12 PROCEDURE — 36415 COLL VENOUS BLD VENIPUNCTURE: CPT

## 2019-08-12 PROCEDURE — 2580000003 HC RX 258: Performed by: PHARMACIST

## 2019-08-12 PROCEDURE — 6360000002 HC RX W HCPCS: Performed by: ORTHOPAEDIC SURGERY

## 2019-08-12 PROCEDURE — 2580000003 HC RX 258: Performed by: INTERNAL MEDICINE

## 2019-08-12 PROCEDURE — 6360000002 HC RX W HCPCS: Performed by: PHARMACIST

## 2019-08-12 PROCEDURE — 6360000002 HC RX W HCPCS: Performed by: INTERNAL MEDICINE

## 2019-08-12 PROCEDURE — 99233 SBSQ HOSP IP/OBS HIGH 50: CPT | Performed by: INTERNAL MEDICINE

## 2019-08-12 PROCEDURE — 82565 ASSAY OF CREATININE: CPT

## 2019-08-12 PROCEDURE — 1200000000 HC SEMI PRIVATE

## 2019-08-12 PROCEDURE — 94760 N-INVAS EAR/PLS OXIMETRY 1: CPT

## 2019-08-12 PROCEDURE — 80202 ASSAY OF VANCOMYCIN: CPT

## 2019-08-12 RX ORDER — EMTRICITABINE AND TENOFOVIR DISOPROXIL FUMARATE 200; 300 MG/1; MG/1
1 TABLET, FILM COATED ORAL DAILY
Status: DISCONTINUED | OUTPATIENT
Start: 2019-08-12 | End: 2019-08-16 | Stop reason: HOSPADM

## 2019-08-12 RX ADMIN — SODIUM CHLORIDE: 9 INJECTION, SOLUTION INTRAVENOUS at 14:41

## 2019-08-12 RX ADMIN — BUPRENORPHINE HYDROCHLORIDE, NALOXONE HYDROCHLORIDE 1 FILM: 8; 2 FILM, SOLUBLE BUCCAL; SUBLINGUAL at 09:25

## 2019-08-12 RX ADMIN — VANCOMYCIN HYDROCHLORIDE 1000 MG: 1 INJECTION, POWDER, LYOPHILIZED, FOR SOLUTION INTRAVENOUS at 17:03

## 2019-08-12 RX ADMIN — DOLUTEGRAVIR SODIUM 50 MG: 50 TABLET, FILM COATED ORAL at 21:26

## 2019-08-12 RX ADMIN — VANCOMYCIN HYDROCHLORIDE 1000 MG: 1 INJECTION, POWDER, LYOPHILIZED, FOR SOLUTION INTRAVENOUS at 09:25

## 2019-08-12 RX ADMIN — EMTRICITABINE AND TENOFOVIR DISOPROXIL FUMARATE 1 TABLET: 200; 300 TABLET, FILM COATED ORAL at 21:26

## 2019-08-12 RX ADMIN — SODIUM CHLORIDE, PRESERVATIVE FREE 10 ML: 5 INJECTION INTRAVENOUS at 09:26

## 2019-08-12 RX ADMIN — CEFEPIME HYDROCHLORIDE 2 G: 2 INJECTION, POWDER, FOR SOLUTION INTRAVENOUS at 02:00

## 2019-08-12 RX ADMIN — KETOROLAC TROMETHAMINE 30 MG: 30 INJECTION, SOLUTION INTRAMUSCULAR at 19:59

## 2019-08-12 RX ADMIN — KETOROLAC TROMETHAMINE 30 MG: 30 INJECTION, SOLUTION INTRAMUSCULAR at 09:32

## 2019-08-12 RX ADMIN — CEFTRIAXONE SODIUM 2 G: 2 INJECTION, POWDER, FOR SOLUTION INTRAMUSCULAR; INTRAVENOUS at 21:26

## 2019-08-12 ASSESSMENT — ENCOUNTER SYMPTOMS
EYE DISCHARGE: 0
BACK PAIN: 0
WHEEZING: 0
NAUSEA: 0
SORE THROAT: 0
SHORTNESS OF BREATH: 0
COUGH: 0
TROUBLE SWALLOWING: 0
RHINORRHEA: 0
EYE REDNESS: 0
ABDOMINAL PAIN: 0
CONSTIPATION: 0
DIARRHEA: 0

## 2019-08-12 ASSESSMENT — PAIN DESCRIPTION - LOCATION
LOCATION: HAND

## 2019-08-12 ASSESSMENT — PAIN SCALES - GENERAL
PAINLEVEL_OUTOF10: 4
PAINLEVEL_OUTOF10: 7
PAINLEVEL_OUTOF10: 7
PAINLEVEL_OUTOF10: 8

## 2019-08-12 ASSESSMENT — PAIN DESCRIPTION - ORIENTATION
ORIENTATION: LEFT

## 2019-08-12 ASSESSMENT — PAIN DESCRIPTION - PROGRESSION
CLINICAL_PROGRESSION: NOT CHANGED
CLINICAL_PROGRESSION: NOT CHANGED
CLINICAL_PROGRESSION: GRADUALLY IMPROVING
CLINICAL_PROGRESSION: NOT CHANGED

## 2019-08-12 ASSESSMENT — PAIN DESCRIPTION - FREQUENCY
FREQUENCY: INTERMITTENT

## 2019-08-12 ASSESSMENT — PAIN - FUNCTIONAL ASSESSMENT
PAIN_FUNCTIONAL_ASSESSMENT: PREVENTS OR INTERFERES SOME ACTIVE ACTIVITIES AND ADLS

## 2019-08-12 ASSESSMENT — PAIN DESCRIPTION - PAIN TYPE
TYPE: ACUTE PAIN

## 2019-08-12 ASSESSMENT — PAIN DESCRIPTION - ONSET
ONSET: ON-GOING

## 2019-08-12 ASSESSMENT — PAIN DESCRIPTION - DESCRIPTORS
DESCRIPTORS: ACHING

## 2019-08-12 NOTE — PROGRESS NOTES
patch, Transdermal, Daily  sodium chloride flush 0.9 % injection 10 mL, 10 mL, Intravenous, 2 times per day  sodium chloride flush 0.9 % injection 10 mL, 10 mL, Intravenous, PRN  magnesium hydroxide (MILK OF MAGNESIA) 400 MG/5ML suspension 30 mL, 30 mL, Oral, Daily PRN  vancomycin (VANCOCIN) intermittent dosing (placeholder), , Other, RX Placeholder  0.9 % sodium chloride infusion, , Intravenous, Continuous  ketorolac (TORADOL) injection 30 mg, 30 mg, Intravenous, Q6H PRN  cefepime (MAXIPIME) 2 g IVPB minibag, 2 g, Intravenous, Q12H  buprenorphine-naloxone (SUBOXONE) 8-2 MG SL film 1 Film, 1 Film, Sublingual, Daily    ASSESSMENT AND PLAN    Left hand cellulitis  Left hand abscess  Hx IVDA  I and D left hand yesterday, stable exam today, mild improvement  Dressing changed left hand by me Will cont daily and also begin elevation from IV pole for swelling  Await final cx and will DC on oral antibx due to social hx prob Tues or Weds.        Morning Sun Anthony  8/12/2019  10:31 AM

## 2019-08-12 NOTE — PROGRESS NOTES
Clinical Pharmacy Note  Vancomycin Consult    Lesley Hemphill is a 26 y.o. male ordered Vancomycin for cellulitis; consult received from Dr. Nelida Ghosh to manage therapy. Also receiving cefepime 2gm q12h. Infectious disease physician is following this patient. Patient Active Problem List   Diagnosis    Hand abscess    Cellulitis of left upper extremity    High fever    HIV positive ([facility] [address]    IV drug user    Chronic hepatitis C without hepatic coma ([facility] [address]    MRSA colonization    Drug or alcohol risk assessment or counseling    Infection requiring contact isolation precautions       Allergies:  Tylenol [acetaminophen]     Temp max:  Temp (24hrs), Av.6 °F (37.6 °C), Min:98 °F (36.7 °C), Max:102.4 °F (39.1 °C)      Recent Labs     08/10/19  0400   WBC 6.7       Recent Labs     08/10/19  0400 19  0702   BUN 8  --    CREATININE 0.8* 0.6*         Intake/Output Summary (Last 24 hours) at 2019 0827  Last data filed at 2019 1107  Gross per 24 hour   Intake 100 ml   Output 20 ml   Net 80 ml       Culture Results:  Surgical culture (19) with 2+ GPC  Blood with no growth to date  MRSA probe (+)    Ht Readings from Last 1 Encounters:   08/10/19 5' 6\" (1.676 m)        Wt Readings from Last 1 Encounters:   19 150 lb 12.7 oz (68.4 kg)         Estimated Creatinine Clearance: 162 mL/min (A) (based on SCr of 0.6 mg/dL (L)). Assessment/Plan:  Day #3 vancomycin. Vancomycin trough is 4.9 mg/L prior to 5th dose of 1gm q12h. SCr is stable at 0.6 today. Will increase dose to 1gm q8h, trough and SCr ordered for prior to the 3rd dose. Thank you for the consult. Will continue to follow. LUX MCGEE, PHARM. D  2019  8:33 AM

## 2019-08-12 NOTE — PROGRESS NOTES
Hospitalist Progress Note      PCP: No primary care provider on file. Date of Admission: 8/10/2019    Subjective: pain lt hand improved    Medications:  Reviewed    Infusion Medications    sodium chloride 100 mL/hr at 08/12/19 1441     Scheduled Medications    vancomycin  1,000 mg Intravenous Q8H    enoxaparin  40 mg Subcutaneous Nightly    nicotine  1 patch Transdermal Daily    sodium chloride flush  10 mL Intravenous 2 times per day    vancomycin (VANCOCIN) intermittent dosing (placeholder)   Other RX Placeholder    buprenorphine-naloxone  1 Film Sublingual Daily     PRN Meds: sodium chloride flush, magnesium hydroxide, ketorolac      Intake/Output Summary (Last 24 hours) at 8/12/2019 1658  Last data filed at 8/12/2019 1441  Gross per 24 hour   Intake 2437 ml   Output --   Net 2437 ml       Physical Exam Performed:    /76   Pulse 73   Temp 98.2 °F (36.8 °C) (Oral)   Resp 20   Ht 5' 6\" (1.676 m)   Wt 150 lb 12.7 oz (68.4 kg)   SpO2 93%   BMI 24.34 kg/m²     Gen: No distress. Eyes: PERRL. No sclera icterus. No conjunctival injection. ENT: No discharge. Pharynx clear. External appearance of ears and nose normal.  Neck: Trachea midline. No obvious mass. Resp: No accessory muscle use. No crackles. No wheezes. No rhonchi. No dullness on percussion. CV: Regular rate. Regular rhythm. No murmur or rub. No edema. GI: Non-tender. Non-distended. No hernia. Skin: Collection present on the dorsal aspect of the left hand. Left hand swollen. Erythema and induration present. Finger movement limited  Lymph: No cervical LAD. No supraclavicular LAD. M/S: No cyanosis. No clubbing. Neuro: Moves all four extremities. CN 2-12 tested, no defect noted. Psych: Oriented x 3. No anxiety. Awake. Alert.  Intact judgement and insight.       Labs:   Recent Labs     08/10/19  0400   WBC 6.7   HGB 13.7   HCT 40.7        Recent Labs     08/10/19  0400 08/12/19  0702   *  --    K 4.0  --

## 2019-08-12 NOTE — PROGRESS NOTES
ORDER#: [id number]                          ORDERED BY: Agatha Mitchell  SOURCE: Blood                              COLLECTED:  08/10/19 03:00  [facility]. :                      RECEIVED :  08/10/19 10:57  If child <=2 yrs old please draw pediatric bottle. ~Blood Culture #2  Performed at:  [address], [facility] [address]   Phone [phone]   Culture Blood #1 [[id number]] Collected: 08/10/19 0245   Order Status: Completed Specimen: Blood Updated: 08/11/19 1115    Blood Culture, Routine No Growth to date.  Any change in status will be called. Narrative:     ORDER#: [id number]                          ORDERED BY: Agatha Mitchell  SOURCE: Blood                              COLLECTED:  08/10/19 02:45  [facility]. :                      RECEIVED :  08/10/19 10:57  If child <=2 yrs old please draw pediatric bottle. ~Blood Culture #1  Performed at:  [address], [facility] [employer] [address]   Phone [phone]   Tissue Culture [[id number]] Collected: 08/11/19 1053   Order Status: Canceled Specimen: Tissue    Wound Culture [[id number]]    Order Status: No result Specimen: Hand          IMAGING:    CT HAND LEFT WO CONTRAST   Final Result   Dorsal soft tissue swelling with question of a vague peripherally enhancing   collection in the dorsum of the wrist between the 3rd and 4th rays possibly   associated with the 4th extensor tendon. Tenosynovitis versus developing   abscess within phlegmon are considered. Consider MRI for more sensitive and   specific soft tissue abnormality could alter clinical management. XR CHEST STANDARD (2 VW)   Final Result   No pneumonia or any other acute cardiopulmonary abnormality. XR HAND LEFT (MIN 3 VIEWS)   Final Result   Dorsal hand soft tissue swelling without evidence of subcutaneous gas. No   evidence of osteomyelitis.       Healed distal 3rd and 4th finger amputations. All the pertinent images and reports for the current Hospitalization were reviewed by me     Scheduled Meds:   vancomycin  1,000 mg Intravenous Q8H    enoxaparin  40 mg Subcutaneous Nightly    nicotine  1 patch Transdermal Daily    sodium chloride flush  10 mL Intravenous 2 times per day    vancomycin (VANCOCIN) intermittent dosing (placeholder)   Other RX Placeholder    cefepime  2 g Intravenous Q12H    buprenorphine-naloxone  1 Film Sublingual Daily       Continuous Infusions:   sodium chloride 100 mL/hr at 08/11/19 1329       PRN Meds:  sodium chloride flush, magnesium hydroxide, ketorolac      Assessment:   Left hand abscess +   Left hand tenosynovitis   H/o IVDA   Hep C+Ve  HIV recently started therapy   Fevers   H/o incarceration+  MRSA colonization   S/p ID and drain of the abscess        Labs, Microbiology, Radiology and all the pertinent results from current hospitalization and  care every where were reviewed  by me as a part of the evaluation   Plan:   1. Cont IV Vancomycin x 1 GM X q 8 HRS  2. Add  IV Ceftriaxone for Group A strep   3. Cont local care  4. He wants to start HIV therapy will choose Dolutegravir + Truvada and he was on BIKTARVY before   5. Not ready for d/c yet      Discussed with patient/Family d/w RN    Thanks for allowing me to participate in your patient's care and please call me with any questions or concerns.     Sirena Berry MD  Infectious Disease  [facility]) Physician  Phone: [phone]   Fax : [phone]

## 2019-08-13 LAB
CREAT SERPL-MCNC: 0.6 MG/DL (ref 0.9–1.3)
GFR AFRICAN AMERICAN: >60
GFR NON-AFRICAN AMERICAN: >60
PLATELET # BLD: 165 K/UL (ref 135–450)
VANCOMYCIN TROUGH: 7 UG/ML (ref 10–20)

## 2019-08-13 PROCEDURE — 6370000000 HC RX 637 (ALT 250 FOR IP): Performed by: INTERNAL MEDICINE

## 2019-08-13 PROCEDURE — 6370000000 HC RX 637 (ALT 250 FOR IP): Performed by: NURSE PRACTITIONER

## 2019-08-13 PROCEDURE — 2580000003 HC RX 258: Performed by: PHARMACIST

## 2019-08-13 PROCEDURE — 6360000002 HC RX W HCPCS: Performed by: INTERNAL MEDICINE

## 2019-08-13 PROCEDURE — 2580000003 HC RX 258: Performed by: ORTHOPAEDIC SURGERY

## 2019-08-13 PROCEDURE — 80202 ASSAY OF VANCOMYCIN: CPT

## 2019-08-13 PROCEDURE — 1200000000 HC SEMI PRIVATE

## 2019-08-13 PROCEDURE — 85049 AUTOMATED PLATELET COUNT: CPT

## 2019-08-13 PROCEDURE — 2580000003 HC RX 258: Performed by: INTERNAL MEDICINE

## 2019-08-13 PROCEDURE — 6360000002 HC RX W HCPCS: Performed by: PHARMACIST

## 2019-08-13 PROCEDURE — 99233 SBSQ HOSP IP/OBS HIGH 50: CPT | Performed by: INTERNAL MEDICINE

## 2019-08-13 PROCEDURE — 6360000002 HC RX W HCPCS: Performed by: ORTHOPAEDIC SURGERY

## 2019-08-13 PROCEDURE — 82565 ASSAY OF CREATININE: CPT

## 2019-08-13 PROCEDURE — 94760 N-INVAS EAR/PLS OXIMETRY 1: CPT

## 2019-08-13 PROCEDURE — 36415 COLL VENOUS BLD VENIPUNCTURE: CPT

## 2019-08-13 RX ADMIN — CEFTRIAXONE SODIUM 2 G: 2 INJECTION, POWDER, FOR SOLUTION INTRAMUSCULAR; INTRAVENOUS at 22:44

## 2019-08-13 RX ADMIN — KETOROLAC TROMETHAMINE 30 MG: 30 INJECTION, SOLUTION INTRAMUSCULAR at 09:51

## 2019-08-13 RX ADMIN — DOLUTEGRAVIR SODIUM 50 MG: 50 TABLET, FILM COATED ORAL at 10:25

## 2019-08-13 RX ADMIN — VANCOMYCIN HYDROCHLORIDE 1000 MG: 1 INJECTION, POWDER, LYOPHILIZED, FOR SOLUTION INTRAVENOUS at 11:51

## 2019-08-13 RX ADMIN — EMTRICITABINE AND TENOFOVIR DISOPROXIL FUMARATE 1 TABLET: 200; 300 TABLET, FILM COATED ORAL at 10:26

## 2019-08-13 RX ADMIN — SODIUM CHLORIDE: 9 INJECTION, SOLUTION INTRAVENOUS at 11:51

## 2019-08-13 RX ADMIN — VANCOMYCIN HYDROCHLORIDE 1000 MG: 1 INJECTION, POWDER, LYOPHILIZED, FOR SOLUTION INTRAVENOUS at 20:20

## 2019-08-13 RX ADMIN — SODIUM CHLORIDE: 9 INJECTION, SOLUTION INTRAVENOUS at 01:09

## 2019-08-13 RX ADMIN — SODIUM CHLORIDE, PRESERVATIVE FREE 10 ML: 5 INJECTION INTRAVENOUS at 20:21

## 2019-08-13 RX ADMIN — VANCOMYCIN HYDROCHLORIDE 1000 MG: 1 INJECTION, POWDER, LYOPHILIZED, FOR SOLUTION INTRAVENOUS at 01:09

## 2019-08-13 RX ADMIN — BUPRENORPHINE HYDROCHLORIDE, NALOXONE HYDROCHLORIDE 1 FILM: 8; 2 FILM, SOLUBLE BUCCAL; SUBLINGUAL at 09:51

## 2019-08-13 ASSESSMENT — PAIN DESCRIPTION - ORIENTATION: ORIENTATION: LEFT

## 2019-08-13 ASSESSMENT — PAIN - FUNCTIONAL ASSESSMENT: PAIN_FUNCTIONAL_ASSESSMENT: PREVENTS OR INTERFERES SOME ACTIVE ACTIVITIES AND ADLS

## 2019-08-13 ASSESSMENT — PAIN DESCRIPTION - ONSET: ONSET: ON-GOING

## 2019-08-13 ASSESSMENT — PAIN DESCRIPTION - PROGRESSION: CLINICAL_PROGRESSION: GRADUALLY IMPROVING

## 2019-08-13 ASSESSMENT — PAIN DESCRIPTION - DESCRIPTORS: DESCRIPTORS: ACHING

## 2019-08-13 ASSESSMENT — PAIN DESCRIPTION - FREQUENCY: FREQUENCY: INTERMITTENT

## 2019-08-13 ASSESSMENT — PAIN DESCRIPTION - PAIN TYPE: TYPE: ACUTE PAIN

## 2019-08-13 ASSESSMENT — PAIN SCALES - GENERAL: PAINLEVEL_OUTOF10: 7

## 2019-08-13 ASSESSMENT — PAIN DESCRIPTION - LOCATION: LOCATION: HAND

## 2019-08-13 NOTE — PROGRESS NOTES
Patient resting in bed quietly watching TV, Iv fluids infusing in right fore arm, pt.tolerating well. Dressing dry and intact on surgical site, call light in reach bed locked and in lowest position, will continue to monitor.

## 2019-08-13 NOTE — PROGRESS NOTES
with him when he was sent to the hospital.     Lt Hand abscess with tenosynovitis- s/p I&D 8/11/19  - ortho and ID consulted  - s/p I&D  - cont IV abx  - cx from OR with strep, hand soaks and dressing changes per ortho     Sepsis  - poa with fever, tachycardia  - blood c/s ordered  - resolved        History of HIV  - last CD4 202  - on triple treatment  - unclear if pt is compliant  - ID consulted, meds per ID     History of IV drug abuse  - restart home suboxone  - no IV pain meds  - urine drug screen reviewed with cocaine/marijuana/benzo/amphetamine        DVT Prophylaxis: lovenox  Diet: DIET GENERAL;  Code Status: Full Code    PT/OT Eval Status: ordered    Dispo - cont care, discussed with Dr Bettie Estrada d/c in 2-3 days on PO abx per ID    Luis Arrieta MD

## 2019-08-13 NOTE — PROGRESS NOTES
This RN was walking in fan and overheard visitor at the door saying,  \"Don't let anyone catch you with that\". Visitor knew that this RN overheard them and told patient. Patient asked if they had anything they weren't supposed to have. Patient was in bathroom at time and patient declined having anything. Charge RN, [address] Run[address] Nicholas, notified. Cali Nicholas went into room and asked patient if they had anything they weren't supposed to have. Patient declined. Sal laid eyes on patient in bathroom and nothing was seen. Will continue to monitor.

## 2019-08-13 NOTE — PROGRESS NOTES
[facility] Orthopedic Surgery   Progress Note      S/P :  SUBJECTIVE  In bed. Alert and oriented. . Pain is   described in left hand and with the intensity of moderate. Pain is described as aching. States does feel better than on this admission and swelling is better in left hand as well. OBJECTIVE              Physical                      VITALS:  /76   Pulse 73   Temp 98.2 °F (36.8 °C) (Oral)   Resp 20   Ht 5' 6\" (1.676 m)   Wt 160 lb 11.5 oz (72.9 kg)   SpO2 95%   BMI 25.94 kg/m²                     MUSCULOSKELETAL:  left hand NVI. Wiggles fingers to command. Radial pulses are palpable. Notable erythema dorsum of left hand as well as proximal 2nd thru 5th fingers, mild tenderness. Swelling has improved 50 % wince elevating in stockingette                   NEUROLOGIC:                                  Sensory:  Touch:  Left Upper Extremity:  normal                                                 Surgical wound appears with small serosang. No odor or purulence is noted at left hand wound.  REdressed as ordered per [facility] RN    Data       CBC:   Lab Results   Component Value Date    WBC 6.7 08/10/2019    RBC 4.79 08/10/2019    HGB 13.7 08/10/2019    HCT 40.7 08/10/2019    MCV 85.1 08/10/2019    MCH 28.7 08/10/2019    MCHC 33.7 08/10/2019    RDW 14.2 08/10/2019     08/13/2019    MPV 9.4 08/10/2019        WBC:    Lab Results   Component Value Date    WBC 6.7 08/10/2019        Hemoglobin/Hematocrit:    Lab Results   Component Value Date    HGB 13.7 08/10/2019    HCT 40.7 08/10/2019        PT/INR:  No results found for: PROTIME, INR           Current Inpatient Medications             Current Facility-Administered Medications: vancomycin 1000 mg IVPB in 250 mL D5W addavial, 1,000 mg, Intravenous, Q8H  cefTRIAXone (ROCEPHIN) 2 g IVPB in D5W 50ml minibag, 2 g, Intravenous, Q24H  dolutegravir sodium (TIVICAY) tablet 50 mg, 50 mg, Oral, Daily  emtricitabine-tenofovir (TRUVADA) 200-300 MG per tablet 1

## 2019-08-13 NOTE — PROGRESS NOTES
Test methodology for HIV-1 RNA quantification is an FDA-approved nucleic acid amplification assay.  The Lower Limit of Quantitation (LLOQ) is 20 copies/mL. The linear range is 20- to 10,000,000 copies/mL. copies/mL   HIV [id number] 5. 101   Comment: HIV-1 RNA has been detected. [id number]/mL   Specimen Collected on   Plasma 7/10/2019 10:07 AM     cd4  202  ON 6/12/2019    MICRO: cultures reviewed and updated by me            Anaerobic and Aerobic Culture [[id number]] Collected: 08/11/19 1053   Order Status: Completed Specimen: Abscess Updated: 08/11/19 1443    Gram Stain Result No WBC's seen   2+ Gram positive cocci    Narrative:     ORDER#: [id number]                          ORDERED BY: Cassandra Pérez  SOURCE: Abscess LEFT HAND                  COLLECTED:  08/11/19 10:53  [facility]. :                      RECEIVED :  08/11/19 11:37  Performed at:  [facility]  [address] [facility] [place] [address]   Phone [phone]   MRSA DNA Probe, Nasal [[id number]] (Abnormal) Collected: 08/10/19 1251   Order Status: Completed Specimen: Nasal from Nares Updated: 08/11/19 1311    Organism Staph aureus MRSAAbnormal     MRSA SCREEN RT-PCR --Abnormal     POSITIVE for   Normal Range: Not detected   CONTACT PRECAUTIONS INDICATED   Abnormal    Narrative:     ORDER#: [id number]                          ORDERED BY: Devin Eastman  SOURCE: Nares                              COLLECTED:  08/10/19 12:51  [facility]. :                      RECEIVED :  08/10/19 13:02  CALL  Rodrigues  [id number] tel. [phone],  Microbiology results called to and read back by Flora Rojas, 08/11/2019  13:11, by [facility]  Performed at:  [facility]  [address] [facility] [address]   Phone [phone]   Culture Blood #2 [[id number]] Collected: 08/10/19 0300   Order Status: Completed Specimen: Blood Updated: 08/11/19 1115    Culture, Blood 2 No Growth to date.  Any change in

## 2019-08-14 LAB
REPORT: NORMAL
VANCOMYCIN TROUGH: 9.1 UG/ML (ref 10–20)

## 2019-08-14 PROCEDURE — 94760 N-INVAS EAR/PLS OXIMETRY 1: CPT

## 2019-08-14 PROCEDURE — 99233 SBSQ HOSP IP/OBS HIGH 50: CPT | Performed by: INTERNAL MEDICINE

## 2019-08-14 PROCEDURE — 2580000003 HC RX 258: Performed by: PHARMACIST

## 2019-08-14 PROCEDURE — 6360000002 HC RX W HCPCS: Performed by: PHARMACIST

## 2019-08-14 PROCEDURE — 6360000002 HC RX W HCPCS: Performed by: ORTHOPAEDIC SURGERY

## 2019-08-14 PROCEDURE — 6370000000 HC RX 637 (ALT 250 FOR IP): Performed by: NURSE PRACTITIONER

## 2019-08-14 PROCEDURE — 2580000003 HC RX 258: Performed by: ORTHOPAEDIC SURGERY

## 2019-08-14 PROCEDURE — 80202 ASSAY OF VANCOMYCIN: CPT

## 2019-08-14 PROCEDURE — 6370000000 HC RX 637 (ALT 250 FOR IP): Performed by: INTERNAL MEDICINE

## 2019-08-14 PROCEDURE — 36415 COLL VENOUS BLD VENIPUNCTURE: CPT

## 2019-08-14 PROCEDURE — 6360000002 HC RX W HCPCS: Performed by: INTERNAL MEDICINE

## 2019-08-14 PROCEDURE — 2580000003 HC RX 258: Performed by: INTERNAL MEDICINE

## 2019-08-14 PROCEDURE — 1200000000 HC SEMI PRIVATE

## 2019-08-14 RX ADMIN — SODIUM CHLORIDE, PRESERVATIVE FREE 10 ML: 5 INJECTION INTRAVENOUS at 21:00

## 2019-08-14 RX ADMIN — BUPRENORPHINE HYDROCHLORIDE, NALOXONE HYDROCHLORIDE 1 FILM: 8; 2 FILM, SOLUBLE BUCCAL; SUBLINGUAL at 09:18

## 2019-08-14 RX ADMIN — DOLUTEGRAVIR SODIUM 50 MG: 50 TABLET, FILM COATED ORAL at 09:17

## 2019-08-14 RX ADMIN — VANCOMYCIN HYDROCHLORIDE 1000 MG: 1 INJECTION, POWDER, LYOPHILIZED, FOR SOLUTION INTRAVENOUS at 04:27

## 2019-08-14 RX ADMIN — EMTRICITABINE AND TENOFOVIR DISOPROXIL FUMARATE 1 TABLET: 200; 300 TABLET, FILM COATED ORAL at 09:17

## 2019-08-14 RX ADMIN — SODIUM CHLORIDE, PRESERVATIVE FREE 10 ML: 5 INJECTION INTRAVENOUS at 10:12

## 2019-08-14 RX ADMIN — CEFTRIAXONE SODIUM 2 G: 2 INJECTION, POWDER, FOR SOLUTION INTRAMUSCULAR; INTRAVENOUS at 21:00

## 2019-08-14 RX ADMIN — VANCOMYCIN HYDROCHLORIDE 1000 MG: 1 INJECTION, POWDER, LYOPHILIZED, FOR SOLUTION INTRAVENOUS at 12:18

## 2019-08-14 RX ADMIN — VANCOMYCIN HYDROCHLORIDE 1000 MG: 1 INJECTION, POWDER, LYOPHILIZED, FOR SOLUTION INTRAVENOUS at 20:28

## 2019-08-14 ASSESSMENT — PAIN DESCRIPTION - FREQUENCY: FREQUENCY: INTERMITTENT

## 2019-08-14 ASSESSMENT — PAIN - FUNCTIONAL ASSESSMENT: PAIN_FUNCTIONAL_ASSESSMENT: PREVENTS OR INTERFERES SOME ACTIVE ACTIVITIES AND ADLS

## 2019-08-14 ASSESSMENT — PAIN DESCRIPTION - ORIENTATION: ORIENTATION: LEFT

## 2019-08-14 ASSESSMENT — PAIN SCALES - GENERAL
PAINLEVEL_OUTOF10: 0
PAINLEVEL_OUTOF10: 4

## 2019-08-14 ASSESSMENT — PAIN DESCRIPTION - PAIN TYPE: TYPE: ACUTE PAIN

## 2019-08-14 ASSESSMENT — PAIN DESCRIPTION - DESCRIPTORS: DESCRIPTORS: ACHING

## 2019-08-14 ASSESSMENT — PAIN DESCRIPTION - ONSET: ONSET: GRADUAL

## 2019-08-14 ASSESSMENT — PAIN SCALES - WONG BAKER: WONGBAKER_NUMERICALRESPONSE: 0

## 2019-08-14 ASSESSMENT — PAIN DESCRIPTION - PROGRESSION: CLINICAL_PROGRESSION: NOT CHANGED

## 2019-08-14 ASSESSMENT — PAIN DESCRIPTION - LOCATION: LOCATION: HAND
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
## 2019-08-15 LAB
BLOOD CULTURE, ROUTINE: NORMAL
CREAT SERPL-MCNC: 0.7 MG/DL (ref 0.9–1.3)
GFR AFRICAN AMERICAN: >60
GFR NON-AFRICAN AMERICAN: >60
VANCOMYCIN TROUGH: 10.1 UG/ML (ref 10–20)

## 2019-08-15 PROCEDURE — 1200000000 HC SEMI PRIVATE

## 2019-08-15 PROCEDURE — 36415 COLL VENOUS BLD VENIPUNCTURE: CPT

## 2019-08-15 PROCEDURE — 80202 ASSAY OF VANCOMYCIN: CPT

## 2019-08-15 PROCEDURE — 6360000002 HC RX W HCPCS: Performed by: INTERNAL MEDICINE

## 2019-08-15 PROCEDURE — 6360000002 HC RX W HCPCS: Performed by: PHARMACIST

## 2019-08-15 PROCEDURE — 82565 ASSAY OF CREATININE: CPT

## 2019-08-15 PROCEDURE — 99232 SBSQ HOSP IP/OBS MODERATE 35: CPT | Performed by: INTERNAL MEDICINE

## 2019-08-15 PROCEDURE — 6360000002 HC RX W HCPCS: Performed by: ORTHOPAEDIC SURGERY

## 2019-08-15 PROCEDURE — 6370000000 HC RX 637 (ALT 250 FOR IP): Performed by: INTERNAL MEDICINE

## 2019-08-15 PROCEDURE — 6370000000 HC RX 637 (ALT 250 FOR IP): Performed by: NURSE PRACTITIONER

## 2019-08-15 PROCEDURE — 2580000003 HC RX 258: Performed by: PHARMACIST

## 2019-08-15 PROCEDURE — 2580000003 HC RX 258: Performed by: INTERNAL MEDICINE

## 2019-08-15 RX ADMIN — EMTRICITABINE AND TENOFOVIR DISOPROXIL FUMARATE 1 TABLET: 200; 300 TABLET, FILM COATED ORAL at 09:15

## 2019-08-15 RX ADMIN — VANCOMYCIN HYDROCHLORIDE 1000 MG: 1 INJECTION, POWDER, LYOPHILIZED, FOR SOLUTION INTRAVENOUS at 04:43

## 2019-08-15 RX ADMIN — CEFTRIAXONE SODIUM 2 G: 2 INJECTION, POWDER, FOR SOLUTION INTRAMUSCULAR; INTRAVENOUS at 21:41

## 2019-08-15 RX ADMIN — VANCOMYCIN HYDROCHLORIDE 1000 MG: 1 INJECTION, POWDER, LYOPHILIZED, FOR SOLUTION INTRAVENOUS at 20:20

## 2019-08-15 RX ADMIN — BUPRENORPHINE HYDROCHLORIDE, NALOXONE HYDROCHLORIDE 1 FILM: 8; 2 FILM, SOLUBLE BUCCAL; SUBLINGUAL at 09:15

## 2019-08-15 RX ADMIN — DOLUTEGRAVIR SODIUM 50 MG: 50 TABLET, FILM COATED ORAL at 09:15

## 2019-08-15 RX ADMIN — VANCOMYCIN HYDROCHLORIDE 1000 MG: 1 INJECTION, POWDER, LYOPHILIZED, FOR SOLUTION INTRAVENOUS at 11:49

## 2019-08-15 ASSESSMENT — PAIN DESCRIPTION - PAIN TYPE: TYPE: ACUTE PAIN

## 2019-08-15 ASSESSMENT — PAIN DESCRIPTION - ONSET: ONSET: ON-GOING

## 2019-08-15 ASSESSMENT — PAIN - FUNCTIONAL ASSESSMENT: PAIN_FUNCTIONAL_ASSESSMENT: PREVENTS OR INTERFERES SOME ACTIVE ACTIVITIES AND ADLS

## 2019-08-15 ASSESSMENT — PAIN DESCRIPTION - ORIENTATION: ORIENTATION: LEFT

## 2019-08-15 ASSESSMENT — PAIN DESCRIPTION - DESCRIPTORS: DESCRIPTORS: BURNING

## 2019-08-15 ASSESSMENT — PAIN DESCRIPTION - FREQUENCY: FREQUENCY: INTERMITTENT

## 2019-08-15 ASSESSMENT — PAIN DESCRIPTION - LOCATION: LOCATION: HAND

## 2019-08-15 ASSESSMENT — PAIN DESCRIPTION - PROGRESSION: CLINICAL_PROGRESSION: GRADUALLY IMPROVING

## 2019-08-15 ASSESSMENT — PAIN SCALES - GENERAL: PAINLEVEL_OUTOF10: 4

## 2019-08-15 NOTE — PROGRESS NOTES
08/13/19  0938   CREATININE 0.6*     No results for input(s): AST, ALT, BILIDIR, BILITOT, ALKPHOS in the last 72 hours. No results for input(s): INR in the last 72 hours. No results for input(s): Aguilar Ganser in the last 72 hours. Urinalysis:      Lab Results   Component Value Date    NITRU Negative 08/11/2019    BLOODU Negative 08/11/2019    SPECGRAV 1.017 08/11/2019    GLUCOSEU Negative 08/11/2019       Radiology:  CT HAND LEFT WO CONTRAST   Final Result   Dorsal soft tissue swelling with question of a vague peripherally enhancing   collection in the dorsum of the wrist between the 3rd and 4th rays possibly   associated with the 4th extensor tendon. Tenosynovitis versus developing   abscess within phlegmon are considered. Consider MRI for more sensitive and   specific soft tissue abnormality could alter clinical management. XR CHEST STANDARD (2 VW)   Final Result   No pneumonia or any other acute cardiopulmonary abnormality. XR HAND LEFT (MIN 3 VIEWS)   Final Result   Dorsal hand soft tissue swelling without evidence of subcutaneous gas. No   evidence of osteomyelitis. Healed distal 3rd and 4th finger amputations. Assessment/Plan:    Active Hospital Problems    Diagnosis    MRSA colonization [Z22.322]    Drug or alcohol risk assessment or counseling [Z71.89]    Infection requiring contact isolation precautions [B99.9]    Hand abscess [L02.519]    Cellulitis of left upper extremity [L03.114]    High fever [R50.9]    HIV positive ([facility] [address] [Z21]    IV drug user [F19.90]    Chronic hepatitis C without hepatic coma ([facility] [address] [B18.2]          The patient is a 29 y. o. male with a past medical history of HIV, hep C and IV drug abuse who [facility] to [facility] with redness swelling of his left hand. In the ER patient was very lethargic and got Narcan with improvement. Per nurse patient had heroin and syringes with him when he was sent to the hospital.       Lt Hand

## 2019-08-15 NOTE — PROGRESS NOTES
Smoker    Packs/day: 0.50    Years: 5.00    Pack years: 2.50    Types: Cigarettes            Family History :  No DVT no COPD  :     REVIEW OF SYSTEMS:    No fever / chills / sweats. No weight loss. No visual change, eye pain, eye discharge. No oral lesion, sore throat, dysphagia. Denies cough / sputum/Sob   Denies chest pain, palpitations/ dizziness  Denies nausea/ vomiting/abdominal pain/diarrhea. Denies dysuria or change in urinary function. Denies joint swelling or pain. No myalgia, arthralgia. No rashes, skin lesions   Denies focal weakness, sensory change or other neurologic symptoms  No lymph node swelling or tenderness.     lEft hand pain, swelling and drainage and local redness+     PHYSICAL EXAM:      Vitals:  T max 103     /79   Pulse 61   Temp 98.3 °F (36.8 °C) (Oral)   Resp 18   Ht 5' 6\" (1.676 m)   Wt 154 lb 15.7 oz (70.3 kg)   SpO2 97%   BMI 25.01 kg/m²     General Appearance: alert,in no acute distress, no pallor, no icterus poor hygiene and Tatoos+ poor dentition+  Skin: warm and dry, no rash or erythema  Head: normocephalic and atraumatic  Eyes: pupils equal, round, and reactive to light, conjunctivae normal  ENT: tympanic membrane, external ear and ear canal normal bilaterally, nose without deformity, nasal mucosa and turbinates normal without polyps  Neck: supple and non-tender without mass, no thyromegaly  no cervical lymphadenopathy  Pulmonary/Chest: clear to auscultation bilaterally- no wheezes, rales or rhonchi, normal air movement, no respiratory distress  Cardiovascular: normal rate, regular rhythm, normal S1 and S2, no murmurs, rubs, clicks, or gallops, no carotid bruits  Abdomen: soft, non-tender, non-distended, normal bowel sounds, no masses or organomegaly  Extremities: no cyanosis, clubbing or edema  Musculoskeletal: normal range of motion, no joint swelling, deformity or tenderness  Neurologic: reflexes normal and symmetric, no cranial nerve deficit  Psych: Updated: 08/11/19 1115    Culture, Blood 2 No Growth to date.  Any change in status will be called. Narrative:     ORDER#: [id number]                          ORDERED BY: Maikel Connor  SOURCE: Blood                              COLLECTED:  08/10/19 03:00  ANTIBIOTICS AT [facility]. :                      RECEIVED :  08/10/19 10:57  If child <=2 yrs old please draw pediatric bottle. ~Blood Culture #2  Performed at:  [place] [address] [address]   Phone [phone]   Culture Blood #1 [[id number]] Collected: 08/10/19 0245   Order Status: Completed Specimen: Blood Updated: 08/11/19 1115    Blood Culture, Routine No Growth to date.  Any change in status will be called. Narrative:     ORDER#: [id number]                          ORDERED BY: Maikel Connor  SOURCE: Blood                              COLLECTED:  08/10/19 02:45  ANTIBIOTICS [facility]. :                      RECEIVED :  08/10/19 10:57  If child <=2 yrs old please draw pediatric bottle. ~Blood Culture #1  Performed at:  [place] [address] [address] [address]   Phone [phone]   Tissue Culture [[id number]] Collected: 08/11/19 1053   Order Status: Canceled Specimen: Tissue    Wound Culture [[id number]]    Order Status: No result Specimen: Hand        Procedure Component Value Units Date/Time   Anaerobic and Aerobic Culture [[id number]] (Abnormal) Collected: 08/11/19 1053   Order Status: Completed Specimen: Abscess Updated: 08/13/19 0756    Gram Stain Result No WBC's seen   2+ Gram positive cocci   Abnormal     Anaerobic Culture Further report to follow    Organism BHS Group A (Strep pyogenes)Abnormal     WOUND/ABSCESS --    Light growth   Susceptibility testing of penicillin and other beta-lactams is   not necessary for beta hemolytic Streptococci since resistant   strains have not been identified.  (CLSI M100)    Narrative:     ORDER#: [id number]           50 mg Oral Daily    emtricitabine-tenofovir  1 tablet Oral Daily    enoxaparin  40 mg Subcutaneous Nightly    nicotine  1 patch Transdermal Daily    sodium chloride flush  10 mL Intravenous 2 times per day    vancomycin (VANCOCIN) intermittent dosing (placeholder)   Other RX Placeholder    buprenorphine-naloxone  1 Film Sublingual Daily       Continuous Infusions:      PRN Meds:  sodium chloride flush, magnesium hydroxide      Assessment:   Left hand abscess +   Left hand tenosynovitis   H/o IVDA   Hep C+Ve  HIV recently started therapy   Fevers   H/o incarceration+  MRSA colonization   S/p ID and drain of the abscess  OR cx Group A strep and given MRSA colonization will cont iv abx PENDING final cx and there is still on going drainage and deep wound will cont IV abx for now    Tolerating HIV therapy and will need follow up at [address] [place] [address], Microbiology, Radiology and all the pertinent results from current hospitalization and  care every where were reviewed  by me as a part of the evaluation   Plan:   1. Cont IV  V Vancomycin x 1 GM X q 8 HRS  2. Cont   IV Ceftriaxone for Group A strep   3. Cont local care  4. He wants to start HIV therapy STARTED on   Dolutegravir + Truvada and he was on BIKTARVY before and has Refills at his pharmacy  5. Not ready for d/c yet  May be Friday  On oral abx   6. Cont elevation left hand   7. Hopefully able to choose oral abx therapy at d/c -will choose Cephalexin 500 mg x q 6 hrs x 14 days     Discussed with patient/Family d/w RN d/w Primary     Thanks for allowing me to participate in your patient's care and please call me with any questions or concerns.     Indra Pearl MD  Infectious Disease  [facility]) Physician  Phone: [phone]   Fax : [phone]

## 2019-08-16 VITALS
BODY MASS INDEX: 24.59 KG/M2 | HEIGHT: 66 IN | DIASTOLIC BLOOD PRESSURE: 60 MMHG | WEIGHT: 153 LBS | HEART RATE: 67 BPM | TEMPERATURE: 97.8 F | RESPIRATION RATE: 16 BRPM | SYSTOLIC BLOOD PRESSURE: 127 MMHG | OXYGEN SATURATION: 98 %

## 2019-08-16 LAB
ANAEROBIC CULTURE: ABNORMAL
CULTURE, BLOOD 2: ABNORMAL
CULTURE, BLOOD 2: ABNORMAL
GRAM STAIN RESULT: ABNORMAL
ORGANISM: ABNORMAL
ORGANISM: ABNORMAL
WOUND/ABSCESS: ABNORMAL

## 2019-08-16 PROCEDURE — 6370000000 HC RX 637 (ALT 250 FOR IP): Performed by: INTERNAL MEDICINE

## 2019-08-16 PROCEDURE — 6370000000 HC RX 637 (ALT 250 FOR IP): Performed by: NURSE PRACTITIONER

## 2019-08-16 PROCEDURE — 6360000002 HC RX W HCPCS: Performed by: PHARMACIST

## 2019-08-16 PROCEDURE — 2580000003 HC RX 258: Performed by: PHARMACIST

## 2019-08-16 PROCEDURE — 6360000002 HC RX W HCPCS: Performed by: ORTHOPAEDIC SURGERY

## 2019-08-16 RX ORDER — CEPHALEXIN 500 MG/1
500 CAPSULE ORAL 4 TIMES DAILY
Qty: 56 CAPSULE | Refills: 0 | Status: SHIPPED | OUTPATIENT
Start: 2019-08-16 | End: 2019-08-30

## 2019-08-16 RX ADMIN — VANCOMYCIN HYDROCHLORIDE 1000 MG: 1 INJECTION, POWDER, LYOPHILIZED, FOR SOLUTION INTRAVENOUS at 12:50

## 2019-08-16 RX ADMIN — BUPRENORPHINE HYDROCHLORIDE, NALOXONE HYDROCHLORIDE 1 FILM: 8; 2 FILM, SOLUBLE BUCCAL; SUBLINGUAL at 08:49

## 2019-08-16 RX ADMIN — EMTRICITABINE AND TENOFOVIR DISOPROXIL FUMARATE 1 TABLET: 200; 300 TABLET, FILM COATED ORAL at 08:49

## 2019-08-16 RX ADMIN — DOLUTEGRAVIR SODIUM 50 MG: 50 TABLET, FILM COATED ORAL at 08:49

## 2019-08-16 RX ADMIN — VANCOMYCIN HYDROCHLORIDE 1000 MG: 1 INJECTION, POWDER, LYOPHILIZED, FOR SOLUTION INTRAVENOUS at 03:37

## 2019-08-16 ASSESSMENT — PAIN DESCRIPTION - LOCATION: LOCATION: HAND

## 2019-08-16 ASSESSMENT — PAIN DESCRIPTION - PAIN TYPE: TYPE: ACUTE PAIN

## 2019-08-16 ASSESSMENT — PAIN DESCRIPTION - FREQUENCY: FREQUENCY: INTERMITTENT

## 2019-08-16 ASSESSMENT — PAIN SCALES - WONG BAKER: WONGBAKER_NUMERICALRESPONSE: 0

## 2019-08-16 ASSESSMENT — PAIN DESCRIPTION - PROGRESSION: CLINICAL_PROGRESSION: NOT CHANGED

## 2019-08-16 ASSESSMENT — PAIN DESCRIPTION - ORIENTATION: ORIENTATION: LEFT

## 2019-08-16 ASSESSMENT — PAIN SCALES - GENERAL: PAINLEVEL_OUTOF10: 4

## 2019-08-16 ASSESSMENT — PAIN - FUNCTIONAL ASSESSMENT: PAIN_FUNCTIONAL_ASSESSMENT: PREVENTS OR INTERFERES SOME ACTIVE ACTIVITIES AND ADLS

## 2019-08-16 ASSESSMENT — PAIN DESCRIPTION - ONSET: ONSET: ON-GOING

## 2019-08-16 ASSESSMENT — PAIN DESCRIPTION - DESCRIPTORS: DESCRIPTORS: BURNING

## 2019-08-16 NOTE — PROGRESS NOTES
Left foot splint and ACe removed per DR Kianna Lombardo for foot eval for swelling. Plan cont NWB left foot. Eval in office next Weds for poss surgery Aug 26 if swelling subsided. Keep left foot elevated all times unless up to BR to reduce swelling. Splint reapplied by me at this time. FOB is elevated.    Home if boot arrives and able to ambulate on right foot in boot iwht NWB left on crutches

## 2019-08-17 NOTE — DISCHARGE SUMMARY
Hospital Medicine Discharge Summary    Patient ID: Alycia Goldstein      Patient's PCP: No primary care provider on file. Admit Date: 8/10/2019     Discharge Date: 8/16/2019      Admitting Physician: Niles Bnoilla MD     Discharge Physician: King Mark MD     Discharge Diagnoses: Active Hospital Problems    Diagnosis    MRSA colonization [Z22.322]    Drug or alcohol risk assessment or counseling [Z71.89]    Infection requiring contact isolation precautions [B99.9]    Hand abscess [L02.519]    Cellulitis of left upper extremity [L03.114]    High fever [R50.9]    HIV positive ([facility] [facility] [Z21]    IV drug user [F19.90]    Chronic hepatitis C without hepatic coma ([facility] [facility] [B18.2]       The patient was seen and examined on day of discharge and this discharge summary is in conjunction with any daily progress note from day of discharge. Hospital Course: The patient is a 29 y. o. male with a past medical history of HIV, hep C and IV drug abuse who presents to [facility] with redness swelling of his left hand. In the ER patient was very lethargic and got Narcan with improvement. Per nurse patient had heroin and syringes with him when he was sent to the hospital.        Lt Hand abscess with tenosynovitis- s/p I&D 8/11/19  - ortho and ID consulted  - s/p I&D  - cont IV abx, d/c on PO abx per ID  - cx from OR with strep, hand soaks and dressing changes per ortho        Sepsis  - resolved        History of HIV  - last CD4 202  - on triple treatment  - unclear if pt is compliant  - ID consulted, meds per ID        History of IV drug abuse  - no IV pain meds  - urine drug screen reviewed with cocaine/marijuana/benzo/amphetamine  - counseled on quitting         Physical Exam Performed:     /60   Pulse 67   Temp 97.8 °F (36.6 °C) (Oral)   Resp 16   Ht 5' 6\" (1.676 m)   Wt 153 lb (69.4 kg)   SpO2 98%   BMI 24.69 kg/m²     Eyes: PERRL. No sclera icterus. No conjunctival injection. ENT: No discharge. Pharynx clear. External appearance of ears and nose normal.  Neck: Trachea midline. No obvious mass.    Resp: No accessory muscle use. No crackles. No wheezes. No rhonchi. No dullness on percussion. CV: Regular rate. Regular rhythm. No murmur or rub. No edema. GI: Non-tender. Non-distended. No hernia. Skin: Collection present on the dorsal aspect of the left hand.  Left hand swollen.  Erythema and induration present.  Finger movement limited  Lymph: No cervical LAD. No supraclavicular LAD. M/S: No cyanosis. No clubbing. Neuro: Moves all four extremities. CN 2-12 tested, no defect noted. Psych: Oriented x 3. No anxiety.  Awake. Alert. Intact judgement and insight       Labs: For convenience and continuity at follow-up the following most recent labs are provided:      CBC:    Lab Results   Component Value Date    WBC 6.7 08/10/2019    HGB 13.7 08/10/2019    HCT 40.7 08/10/2019     08/13/2019       Renal:    Lab Results   Component Value Date     08/10/2019    K 4.0 08/10/2019    CL 93 08/10/2019    CO2 26 08/10/2019    BUN 8 08/10/2019    CREATININE 0.7 08/15/2019    CALCIUM 9.0 08/10/2019         Significant Diagnostic Studies    Radiology:   CT HAND LEFT WO CONTRAST   Final Result   Dorsal soft tissue swelling with question of a vague peripherally enhancing   collection in the dorsum of the wrist between the 3rd and 4th rays possibly   associated with the 4th extensor tendon. Tenosynovitis versus developing   abscess within phlegmon are considered. Consider MRI for more sensitive and   specific soft tissue abnormality could alter clinical management. XR CHEST STANDARD (2 VW)   Final Result   No pneumonia or any other acute cardiopulmonary abnormality. XR HAND LEFT (MIN 3 VIEWS)   Final Result   Dorsal hand soft tissue swelling without evidence of subcutaneous gas. No   evidence of osteomyelitis. Healed distal 3rd and 4th finger amputations.                 Consults:

## 2020-04-01 ENCOUNTER — HOSPITAL ENCOUNTER (INPATIENT)
Age: 30
LOS: 2 days | Discharge: HOME | DRG: 247 | End: 2020-04-03
Attending: EMERGENCY MEDICINE | Admitting: INTERNAL MEDICINE
Payer: MEDICAID

## 2020-04-01 ENCOUNTER — APPOINTMENT (OUTPATIENT)
Dept: CT IMAGING | Age: 30
DRG: 247 | End: 2020-04-01
Payer: MEDICAID

## 2020-04-01 PROBLEM — F12.10 CANNABIS ABUSE: Status: ACTIVE | Noted: 2020-04-01

## 2020-04-01 PROBLEM — K56.609 SBO (SMALL BOWEL OBSTRUCTION) (HCC): Status: ACTIVE | Noted: 2020-04-01

## 2020-04-01 PROBLEM — R74.01 TRANSAMINITIS: Status: ACTIVE | Noted: 2020-04-01

## 2020-04-01 PROBLEM — R11.2 INTRACTABLE NAUSEA AND VOMITING: Status: ACTIVE | Noted: 2020-04-01

## 2020-04-01 PROBLEM — R10.9 INTRACTABLE ABDOMINAL PAIN: Status: ACTIVE | Noted: 2020-04-01

## 2020-04-01 PROBLEM — K50.00 REGIONAL ENTERITIS OF SMALL BOWEL (HCC): Status: ACTIVE | Noted: 2020-04-01

## 2020-04-01 PROBLEM — E87.0 HYPERNATREMIA: Status: ACTIVE | Noted: 2020-04-01

## 2020-04-01 PROBLEM — F14.90 COCAINE USE: Status: ACTIVE | Noted: 2020-04-01

## 2020-04-01 LAB
A/G RATIO: 1.2 (ref 1.1–2.2)
ALBUMIN SERPL-MCNC: 3.5 G/DL (ref 3.4–5)
ALP BLD-CCNC: 52 U/L (ref 40–129)
ALT SERPL-CCNC: 54 U/L (ref 10–40)
AMPHETAMINE SCREEN, URINE: ABNORMAL
AMYLASE: 62 U/L (ref 25–115)
ANION GAP SERPL CALCULATED.3IONS-SCNC: 12 MMOL/L (ref 3–16)
AST SERPL-CCNC: 61 U/L (ref 15–37)
BACTERIA: ABNORMAL /HPF
BARBITURATE SCREEN URINE: ABNORMAL
BASOPHILS ABSOLUTE: 0 K/UL (ref 0–0.2)
BASOPHILS RELATIVE PERCENT: 0.5 %
BENZODIAZEPINE SCREEN, URINE: ABNORMAL
BILIRUB SERPL-MCNC: <0.2 MG/DL (ref 0–1)
BILIRUBIN URINE: ABNORMAL
BLOOD, URINE: NEGATIVE
BUN BLDV-MCNC: 11 MG/DL (ref 7–20)
CALCIUM SERPL-MCNC: 8.1 MG/DL (ref 8.3–10.6)
CANNABINOID SCREEN URINE: POSITIVE
CHLORIDE BLD-SCNC: 109 MMOL/L (ref 99–110)
CLARITY: CLEAR
CO2: 25 MMOL/L (ref 21–32)
COCAINE METABOLITE SCREEN URINE: POSITIVE
COLOR: ABNORMAL
CREAT SERPL-MCNC: 0.6 MG/DL (ref 0.9–1.3)
EOSINOPHILS ABSOLUTE: 0 K/UL (ref 0–0.6)
EOSINOPHILS RELATIVE PERCENT: 0.1 %
EPITHELIAL CELLS, UA: ABNORMAL /HPF (ref 0–5)
GFR AFRICAN AMERICAN: >60
GFR NON-AFRICAN AMERICAN: >60
GLOBULIN: 3 G/DL
GLUCOSE BLD-MCNC: 128 MG/DL (ref 70–99)
GLUCOSE URINE: NEGATIVE MG/DL
HCT VFR BLD CALC: 39.4 % (ref 40.5–52.5)
HEMOGLOBIN: 13.4 G/DL (ref 13.5–17.5)
KETONES, URINE: NEGATIVE MG/DL
LACTIC ACID: 1.6 MMOL/L (ref 0.4–2)
LEUKOCYTE ESTERASE, URINE: NEGATIVE
LIPASE: 12 U/L (ref 13–60)
LIPASE: 27 U/L (ref 13–60)
LYMPHOCYTES ABSOLUTE: 0.7 K/UL (ref 1–5.1)
LYMPHOCYTES RELATIVE PERCENT: 11.1 %
Lab: ABNORMAL
MCH RBC QN AUTO: 30.7 PG (ref 26–34)
MCHC RBC AUTO-ENTMCNC: 33.9 G/DL (ref 31–36)
MCV RBC AUTO: 90.4 FL (ref 80–100)
METHADONE SCREEN, URINE: ABNORMAL
MICROSCOPIC EXAMINATION: YES
MONOCYTES ABSOLUTE: 0.4 K/UL (ref 0–1.3)
MONOCYTES RELATIVE PERCENT: 6.8 %
MUCUS: ABNORMAL /LPF
NEUTROPHILS ABSOLUTE: 4.9 K/UL (ref 1.7–7.7)
NEUTROPHILS RELATIVE PERCENT: 81.5 %
NITRITE, URINE: NEGATIVE
OPIATE SCREEN URINE: ABNORMAL
OXYCODONE URINE: ABNORMAL
PDW BLD-RTO: 14.4 % (ref 12.4–15.4)
PH UA: 7
PH UA: 7 (ref 5–8)
PHENCYCLIDINE SCREEN URINE: ABNORMAL
PLATELET # BLD: 139 K/UL (ref 135–450)
PMV BLD AUTO: 10.5 FL (ref 5–10.5)
POTASSIUM REFLEX MAGNESIUM: 3.7 MMOL/L (ref 3.5–5.1)
PROPOXYPHENE SCREEN: ABNORMAL
PROTEIN UA: 30 MG/DL
RBC # BLD: 4.36 M/UL (ref 4.2–5.9)
RBC UA: ABNORMAL /HPF (ref 0–4)
REASON FOR REJECTION: NORMAL
REJECTED TEST: NORMAL
SODIUM BLD-SCNC: 146 MMOL/L (ref 136–145)
SPECIFIC GRAVITY UA: 1.02 (ref 1–1.03)
TOTAL PROTEIN: 6.5 G/DL (ref 6.4–8.2)
URINE REFLEX TO CULTURE: ABNORMAL
URINE TYPE: ABNORMAL
UROBILINOGEN, URINE: 2 E.U./DL
WBC # BLD: 6 K/UL (ref 4–11)
WBC UA: ABNORMAL /HPF (ref 0–5)

## 2020-04-01 PROCEDURE — 6370000000 HC RX 637 (ALT 250 FOR IP): Performed by: EMERGENCY MEDICINE

## 2020-04-01 PROCEDURE — 2500000003 HC RX 250 WO HCPCS: Performed by: INTERNAL MEDICINE

## 2020-04-01 PROCEDURE — 1200000000 HC SEMI PRIVATE

## 2020-04-01 PROCEDURE — 6360000002 HC RX W HCPCS: Performed by: EMERGENCY MEDICINE

## 2020-04-01 PROCEDURE — 85025 COMPLETE CBC W/AUTO DIFF WBC: CPT

## 2020-04-01 PROCEDURE — 36415 COLL VENOUS BLD VENIPUNCTURE: CPT

## 2020-04-01 PROCEDURE — 99285 EMERGENCY DEPT VISIT HI MDM: CPT

## 2020-04-01 PROCEDURE — 2580000003 HC RX 258: Performed by: INTERNAL MEDICINE

## 2020-04-01 PROCEDURE — 96375 TX/PRO/DX INJ NEW DRUG ADDON: CPT

## 2020-04-01 PROCEDURE — 83690 ASSAY OF LIPASE: CPT

## 2020-04-01 PROCEDURE — 82150 ASSAY OF AMYLASE: CPT

## 2020-04-01 PROCEDURE — 81001 URINALYSIS AUTO W/SCOPE: CPT

## 2020-04-01 PROCEDURE — 6360000002 HC RX W HCPCS: Performed by: INTERNAL MEDICINE

## 2020-04-01 PROCEDURE — 2580000003 HC RX 258: Performed by: EMERGENCY MEDICINE

## 2020-04-01 PROCEDURE — 80053 COMPREHEN METABOLIC PANEL: CPT

## 2020-04-01 PROCEDURE — 96374 THER/PROPH/DIAG INJ IV PUSH: CPT

## 2020-04-01 PROCEDURE — 74176 CT ABD & PELVIS W/O CONTRAST: CPT

## 2020-04-01 PROCEDURE — 2500000003 HC RX 250 WO HCPCS: Performed by: EMERGENCY MEDICINE

## 2020-04-01 PROCEDURE — 83605 ASSAY OF LACTIC ACID: CPT

## 2020-04-01 PROCEDURE — 80307 DRUG TEST PRSMV CHEM ANLYZR: CPT

## 2020-04-01 PROCEDURE — 99254 IP/OBS CNSLTJ NEW/EST MOD 60: CPT | Performed by: INTERNAL MEDICINE

## 2020-04-01 RX ORDER — MORPHINE SULFATE 4 MG/ML
4 INJECTION, SOLUTION INTRAMUSCULAR; INTRAVENOUS EVERY 4 HOURS PRN
Status: DISCONTINUED | OUTPATIENT
Start: 2020-04-01 | End: 2020-04-02

## 2020-04-01 RX ORDER — PROMETHAZINE HYDROCHLORIDE 25 MG/1
12.5 TABLET ORAL EVERY 6 HOURS PRN
Status: DISCONTINUED | OUTPATIENT
Start: 2020-04-01 | End: 2020-04-03 | Stop reason: HOSPADM

## 2020-04-01 RX ORDER — LORAZEPAM 2 MG/ML
0.5 INJECTION INTRAMUSCULAR EVERY 4 HOURS PRN
Status: DISCONTINUED | OUTPATIENT
Start: 2020-04-01 | End: 2020-04-03 | Stop reason: HOSPADM

## 2020-04-01 RX ORDER — CIPROFLOXACIN 2 MG/ML
400 INJECTION, SOLUTION INTRAVENOUS EVERY 12 HOURS
Status: DISCONTINUED | OUTPATIENT
Start: 2020-04-01 | End: 2020-04-02

## 2020-04-01 RX ORDER — SODIUM CHLORIDE 9 MG/ML
INJECTION, SOLUTION INTRAVENOUS CONTINUOUS
Status: DISCONTINUED | OUTPATIENT
Start: 2020-04-01 | End: 2020-04-02

## 2020-04-01 RX ORDER — ACETAMINOPHEN 325 MG/1
650 TABLET ORAL EVERY 6 HOURS PRN
Status: DISCONTINUED | OUTPATIENT
Start: 2020-04-01 | End: 2020-04-03 | Stop reason: HOSPADM

## 2020-04-01 RX ORDER — ONDANSETRON 2 MG/ML
4 INJECTION INTRAMUSCULAR; INTRAVENOUS ONCE
Status: COMPLETED | OUTPATIENT
Start: 2020-04-01 | End: 2020-04-01

## 2020-04-01 RX ORDER — MORPHINE SULFATE 4 MG/ML
4 INJECTION, SOLUTION INTRAMUSCULAR; INTRAVENOUS ONCE
Status: COMPLETED | OUTPATIENT
Start: 2020-04-01 | End: 2020-04-01

## 2020-04-01 RX ORDER — ONDANSETRON 2 MG/ML
4 INJECTION INTRAMUSCULAR; INTRAVENOUS EVERY 6 HOURS PRN
Status: DISCONTINUED | OUTPATIENT
Start: 2020-04-01 | End: 2020-04-03 | Stop reason: HOSPADM

## 2020-04-01 RX ORDER — 0.9 % SODIUM CHLORIDE 0.9 %
1000 INTRAVENOUS SOLUTION INTRAVENOUS ONCE
Status: COMPLETED | OUTPATIENT
Start: 2020-04-01 | End: 2020-04-01

## 2020-04-01 RX ORDER — SODIUM CHLORIDE 0.9 % (FLUSH) 0.9 %
10 SYRINGE (ML) INJECTION EVERY 12 HOURS SCHEDULED
Status: DISCONTINUED | OUTPATIENT
Start: 2020-04-01 | End: 2020-04-03 | Stop reason: HOSPADM

## 2020-04-01 RX ORDER — MAGNESIUM SULFATE IN WATER 40 MG/ML
2 INJECTION, SOLUTION INTRAVENOUS PRN
Status: DISCONTINUED | OUTPATIENT
Start: 2020-04-01 | End: 2020-04-03 | Stop reason: HOSPADM

## 2020-04-01 RX ORDER — KETOROLAC TROMETHAMINE 30 MG/ML
15 INJECTION, SOLUTION INTRAMUSCULAR; INTRAVENOUS ONCE
Status: COMPLETED | OUTPATIENT
Start: 2020-04-01 | End: 2020-04-01

## 2020-04-01 RX ORDER — ACETAMINOPHEN 650 MG/1
650 SUPPOSITORY RECTAL EVERY 6 HOURS PRN
Status: DISCONTINUED | OUTPATIENT
Start: 2020-04-01 | End: 2020-04-03 | Stop reason: HOSPADM

## 2020-04-01 RX ORDER — POLYETHYLENE GLYCOL 3350 17 G/17G
17 POWDER, FOR SOLUTION ORAL DAILY PRN
Status: DISCONTINUED | OUTPATIENT
Start: 2020-04-01 | End: 2020-04-03 | Stop reason: HOSPADM

## 2020-04-01 RX ORDER — SODIUM CHLORIDE 0.9 % (FLUSH) 0.9 %
10 SYRINGE (ML) INJECTION PRN
Status: DISCONTINUED | OUTPATIENT
Start: 2020-04-01 | End: 2020-04-03 | Stop reason: HOSPADM

## 2020-04-01 RX ORDER — POTASSIUM CHLORIDE 7.45 MG/ML
10 INJECTION INTRAVENOUS PRN
Status: DISCONTINUED | OUTPATIENT
Start: 2020-04-01 | End: 2020-04-03 | Stop reason: HOSPADM

## 2020-04-01 RX ORDER — DICYCLOMINE HYDROCHLORIDE 10 MG/1
20 CAPSULE ORAL ONCE
Status: COMPLETED | OUTPATIENT
Start: 2020-04-01 | End: 2020-04-01

## 2020-04-01 RX ADMIN — METRONIDAZOLE 500 MG: 500 INJECTION, SOLUTION INTRAVENOUS at 16:41

## 2020-04-01 RX ADMIN — MORPHINE SULFATE 4 MG: 4 INJECTION, SOLUTION INTRAMUSCULAR; INTRAVENOUS at 19:41

## 2020-04-01 RX ADMIN — KETOROLAC TROMETHAMINE 15 MG: 30 INJECTION, SOLUTION INTRAMUSCULAR at 07:03

## 2020-04-01 RX ADMIN — DICYCLOMINE HYDROCHLORIDE 20 MG: 10 CAPSULE ORAL at 08:10

## 2020-04-01 RX ADMIN — METRONIDAZOLE 500 MG: 500 INJECTION, SOLUTION INTRAVENOUS at 23:55

## 2020-04-01 RX ADMIN — LORAZEPAM 0.5 MG: 2 INJECTION INTRAMUSCULAR; INTRAVENOUS at 21:35

## 2020-04-01 RX ADMIN — CIPROFLOXACIN 400 MG: 2 INJECTION, SOLUTION INTRAVENOUS at 15:16

## 2020-04-01 RX ADMIN — SODIUM CHLORIDE 1000 ML: 9 INJECTION, SOLUTION INTRAVENOUS at 09:22

## 2020-04-01 RX ADMIN — MORPHINE SULFATE 4 MG: 4 INJECTION, SOLUTION INTRAMUSCULAR; INTRAVENOUS at 23:55

## 2020-04-01 RX ADMIN — ONDANSETRON HYDROCHLORIDE 4 MG: 2 SOLUTION INTRAMUSCULAR; INTRAVENOUS at 07:02

## 2020-04-01 RX ADMIN — SODIUM CHLORIDE 1000 ML: 9 INJECTION, SOLUTION INTRAVENOUS at 07:03

## 2020-04-01 RX ADMIN — SODIUM CHLORIDE: 9 INJECTION, SOLUTION INTRAVENOUS at 23:59

## 2020-04-01 RX ADMIN — FAMOTIDINE 20 MG: 10 INJECTION, SOLUTION INTRAVENOUS at 07:02

## 2020-04-01 RX ADMIN — SODIUM CHLORIDE: 9 INJECTION, SOLUTION INTRAVENOUS at 15:05

## 2020-04-01 RX ADMIN — MORPHINE SULFATE 4 MG: 4 INJECTION, SOLUTION INTRAMUSCULAR; INTRAVENOUS at 15:32

## 2020-04-01 RX ADMIN — MORPHINE SULFATE 4 MG: 4 INJECTION, SOLUTION INTRAMUSCULAR; INTRAVENOUS at 09:22

## 2020-04-01 ASSESSMENT — PAIN DESCRIPTION - DESCRIPTORS
DESCRIPTORS: PRESSURE;ACHING
DESCRIPTORS: PRESSURE;SHARP
DESCRIPTORS: PRESSURE;THROBBING
DESCRIPTORS: ACHING
DESCRIPTORS: PRESSURE;ACHING
DESCRIPTORS: PRESSURE;SHARP

## 2020-04-01 ASSESSMENT — PAIN DESCRIPTION - LOCATION
LOCATION: ABDOMEN

## 2020-04-01 ASSESSMENT — PAIN DESCRIPTION - PROGRESSION
CLINICAL_PROGRESSION: GRADUALLY WORSENING

## 2020-04-01 ASSESSMENT — PAIN DESCRIPTION - ORIENTATION
ORIENTATION: MID

## 2020-04-01 ASSESSMENT — PAIN DESCRIPTION - PAIN TYPE
TYPE: ACUTE PAIN

## 2020-04-01 ASSESSMENT — PAIN SCALES - GENERAL
PAINLEVEL_OUTOF10: 6
PAINLEVEL_OUTOF10: 10
PAINLEVEL_OUTOF10: 7
PAINLEVEL_OUTOF10: 9
PAINLEVEL_OUTOF10: 7
PAINLEVEL_OUTOF10: 10
PAINLEVEL_OUTOF10: 9
PAINLEVEL_OUTOF10: 0
PAINLEVEL_OUTOF10: 10

## 2020-04-01 ASSESSMENT — PAIN DESCRIPTION - FREQUENCY
FREQUENCY: CONTINUOUS
FREQUENCY: INTERMITTENT

## 2020-04-01 ASSESSMENT — PAIN DESCRIPTION - ONSET
ONSET: ON-GOING

## 2020-04-01 ASSESSMENT — PAIN - FUNCTIONAL ASSESSMENT
PAIN_FUNCTIONAL_ASSESSMENT: ACTIVITIES ARE NOT PREVENTED
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
## 2020-04-01 NOTE — ED NOTES
Arrives per EMS from home complaining of abd pain/soreness when he tries to have a BM  States that this discomfort started approx 3am  Did vomit small amount en route of undigested food.   Skin w/d color good and resp are easy, occasionally yells out a profanity and states \"it hurts\"    MD examined, orders received and noted     Iram David, RN  04/01/20 [id number]
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
## 2020-04-01 NOTE — ED PROVIDER NOTES
Tobacco Use    Smoking status: Current Every Day Smoker     Packs/day: 0.50     Years: 5.00     Pack years: 2.50     Types: Cigarettes    Smokeless tobacco: Never Used   Substance and Sexual Activity    Alcohol use: Yes     Alcohol/week: 1.0 standard drinks     Types: 1 Shots of liquor per week    Drug use: Yes    Sexual activity: Not Currently   Lifestyle    Physical activity     Days per week: Not on file     Minutes per session: Not on file    Stress: Not on file   Relationships    Social connections     Talks on phone: Not on file     Gets together: Not on file     Attends Zoroastrian service: Not on file     Active member of club or organization: Not on file     Attends meetings of clubs or organizations: Not on file     Relationship status: Not on file    Intimate partner violence     Fear of current or ex partner: Not on file     Emotionally abused: Not on file     Physically abused: Not on file     Forced sexual activity: Not on file   Other Topics Concern    Not on file   Social History Narrative    Not on file     Current Facility-Administered Medications   Medication Dose Route Frequency Provider Last Rate Last Dose    ketorolac (TORADOL) injection 15 mg  15 mg Intravenous Once Yvonnie Dynes, DO        [facility]ron [facility] injection 4 mg  4 mg Intravenous Once Yvonnie Dynes, DO        0.9 % sodium chloride bolus  1,000 mL Intravenous Once Yvonnie Dynes, DO        famotidine (PEPCID) injection 20 mg  20 mg Intravenous Once Yvonnie Dynes, DO         Current Outpatient Medications   Medication Sig Dispense Refill    buprenorphine-naloxone (SUBOXONE) 8-2 MG SUBL SL tablet Place 1 tablet under the tongue daily.  Hasn't picked this med up yet      Bictegravir-Emtricitab-Tenofov (BIKTARVY) -25 MG TABS Take 1 tablet by mouth daily Not currently taking       Allergies   Allergen Reactions    Tylenol [Acetaminophen] Shortness Of Breath    Suboxone [Buprenorphine

## 2020-04-02 ENCOUNTER — APPOINTMENT (OUTPATIENT)
Dept: GENERAL RADIOLOGY | Age: 30
DRG: 247 | End: 2020-04-02
Payer: MEDICAID

## 2020-04-02 LAB
ANION GAP SERPL CALCULATED.3IONS-SCNC: 11 MMOL/L (ref 3–16)
BASOPHILS ABSOLUTE: 0 K/UL (ref 0–0.2)
BASOPHILS RELATIVE PERCENT: 0.5 %
BUN BLDV-MCNC: 9 MG/DL (ref 7–20)
CALCIUM SERPL-MCNC: 8.2 MG/DL (ref 8.3–10.6)
CHLORIDE BLD-SCNC: 112 MMOL/L (ref 99–110)
CO2: 24 MMOL/L (ref 21–32)
CREAT SERPL-MCNC: 0.8 MG/DL (ref 0.9–1.3)
EOSINOPHILS ABSOLUTE: 0 K/UL (ref 0–0.6)
EOSINOPHILS RELATIVE PERCENT: 0.2 %
GFR AFRICAN AMERICAN: >60
GFR NON-AFRICAN AMERICAN: >60
GLUCOSE BLD-MCNC: 101 MG/DL (ref 70–99)
HCT VFR BLD CALC: 35.9 % (ref 40.5–52.5)
HEMOGLOBIN: 12.3 G/DL (ref 13.5–17.5)
LYMPHOCYTES ABSOLUTE: 1.3 K/UL (ref 1–5.1)
LYMPHOCYTES RELATIVE PERCENT: 22.6 %
MCH RBC QN AUTO: 30.8 PG (ref 26–34)
MCHC RBC AUTO-ENTMCNC: 34.3 G/DL (ref 31–36)
MCV RBC AUTO: 89.9 FL (ref 80–100)
MONOCYTES ABSOLUTE: 0.5 K/UL (ref 0–1.3)
MONOCYTES RELATIVE PERCENT: 9.4 %
NEUTROPHILS ABSOLUTE: 3.9 K/UL (ref 1.7–7.7)
NEUTROPHILS RELATIVE PERCENT: 67.3 %
PDW BLD-RTO: 14.4 % (ref 12.4–15.4)
PLATELET # BLD: 187 K/UL (ref 135–450)
PMV BLD AUTO: 8.9 FL (ref 5–10.5)
POTASSIUM REFLEX MAGNESIUM: 3.7 MMOL/L (ref 3.5–5.1)
RBC # BLD: 4 M/UL (ref 4.2–5.9)
SODIUM BLD-SCNC: 147 MMOL/L (ref 136–145)
WBC # BLD: 5.9 K/UL (ref 4–11)

## 2020-04-02 PROCEDURE — 74018 RADEX ABDOMEN 1 VIEW: CPT

## 2020-04-02 PROCEDURE — APPSS30 APP SPLIT SHARED TIME 16-30 MINUTES: Performed by: NURSE PRACTITIONER

## 2020-04-02 PROCEDURE — 99254 IP/OBS CNSLTJ NEW/EST MOD 60: CPT | Performed by: SURGERY

## 2020-04-02 PROCEDURE — 85025 COMPLETE CBC W/AUTO DIFF WBC: CPT

## 2020-04-02 PROCEDURE — 94760 N-INVAS EAR/PLS OXIMETRY 1: CPT

## 2020-04-02 PROCEDURE — 2580000003 HC RX 258: Performed by: INTERNAL MEDICINE

## 2020-04-02 PROCEDURE — 99232 SBSQ HOSP IP/OBS MODERATE 35: CPT | Performed by: INTERNAL MEDICINE

## 2020-04-02 PROCEDURE — 6360000002 HC RX W HCPCS: Performed by: INTERNAL MEDICINE

## 2020-04-02 PROCEDURE — 36415 COLL VENOUS BLD VENIPUNCTURE: CPT

## 2020-04-02 PROCEDURE — APPNB30 APP NON BILLABLE TIME 0-30 MINS: Performed by: NURSE PRACTITIONER

## 2020-04-02 PROCEDURE — 2500000003 HC RX 250 WO HCPCS: Performed by: INTERNAL MEDICINE

## 2020-04-02 PROCEDURE — 80048 BASIC METABOLIC PNL TOTAL CA: CPT

## 2020-04-02 PROCEDURE — 6370000000 HC RX 637 (ALT 250 FOR IP): Performed by: INTERNAL MEDICINE

## 2020-04-02 PROCEDURE — 87505 NFCT AGENT DETECTION GI: CPT

## 2020-04-02 PROCEDURE — 1200000000 HC SEMI PRIVATE

## 2020-04-02 RX ORDER — BUPRENORPHINE AND NALOXONE 8; 2 MG/1; MG/1
1 FILM, SOLUBLE BUCCAL; SUBLINGUAL DAILY
Status: DISCONTINUED | OUTPATIENT
Start: 2020-04-02 | End: 2020-04-03 | Stop reason: HOSPADM

## 2020-04-02 RX ORDER — IBUPROFEN 400 MG/1
400 TABLET ORAL EVERY 6 HOURS PRN
Status: DISCONTINUED | OUTPATIENT
Start: 2020-04-02 | End: 2020-04-03 | Stop reason: HOSPADM

## 2020-04-02 RX ORDER — CIPROFLOXACIN 500 MG/1
500 TABLET, FILM COATED ORAL EVERY 12 HOURS SCHEDULED
Status: DISCONTINUED | OUTPATIENT
Start: 2020-04-02 | End: 2020-04-03 | Stop reason: HOSPADM

## 2020-04-02 RX ORDER — DEXTROSE MONOHYDRATE 50 MG/ML
INJECTION, SOLUTION INTRAVENOUS CONTINUOUS
Status: DISCONTINUED | OUTPATIENT
Start: 2020-04-02 | End: 2020-04-02

## 2020-04-02 RX ORDER — BISACODYL 10 MG
10 SUPPOSITORY, RECTAL RECTAL ONCE
Status: COMPLETED | OUTPATIENT
Start: 2020-04-02 | End: 2020-04-02

## 2020-04-02 RX ORDER — KETOROLAC TROMETHAMINE 30 MG/ML
30 INJECTION, SOLUTION INTRAMUSCULAR; INTRAVENOUS EVERY 6 HOURS PRN
Status: DISCONTINUED | OUTPATIENT
Start: 2020-04-02 | End: 2020-04-02

## 2020-04-02 RX ORDER — POLYETHYLENE GLYCOL 3350 17 G/17G
17 POWDER, FOR SOLUTION ORAL 2 TIMES DAILY
Status: DISCONTINUED | OUTPATIENT
Start: 2020-04-02 | End: 2020-04-03 | Stop reason: HOSPADM

## 2020-04-02 RX ORDER — METRONIDAZOLE 500 MG/1
500 TABLET ORAL EVERY 8 HOURS SCHEDULED
Status: DISCONTINUED | OUTPATIENT
Start: 2020-04-02 | End: 2020-04-03 | Stop reason: HOSPADM

## 2020-04-02 RX ADMIN — SODIUM CHLORIDE: 9 INJECTION, SOLUTION INTRAVENOUS at 10:42

## 2020-04-02 RX ADMIN — BISACODYL 10 MG: 10 SUPPOSITORY RECTAL at 10:34

## 2020-04-02 RX ADMIN — CIPROFLOXACIN 400 MG: 2 INJECTION, SOLUTION INTRAVENOUS at 03:21

## 2020-04-02 RX ADMIN — CIPROFLOXACIN 500 MG: 500 TABLET, FILM COATED ORAL at 23:33

## 2020-04-02 RX ADMIN — CIPROFLOXACIN 400 MG: 2 INJECTION, SOLUTION INTRAVENOUS at 14:29

## 2020-04-02 RX ADMIN — METRONIDAZOLE 500 MG: 500 INJECTION, SOLUTION INTRAVENOUS at 16:02

## 2020-04-02 RX ADMIN — MORPHINE SULFATE 4 MG: 4 INJECTION, SOLUTION INTRAMUSCULAR; INTRAVENOUS at 06:18

## 2020-04-02 RX ADMIN — KETOROLAC TROMETHAMINE 30 MG: 30 INJECTION, SOLUTION INTRAMUSCULAR at 10:34

## 2020-04-02 RX ADMIN — DEXTROSE MONOHYDRATE: 50 INJECTION, SOLUTION INTRAVENOUS at 16:02

## 2020-04-02 RX ADMIN — METHYLNALTREXONE BROMIDE 12 MG: 12 INJECTION, SOLUTION SUBCUTANEOUS at 10:34

## 2020-04-02 RX ADMIN — POLYETHYLENE GLYCOL 3350 17 G: 17 POWDER, FOR SOLUTION ORAL at 21:34

## 2020-04-02 RX ADMIN — POLYETHYLENE GLYCOL 3350 17 G: 17 POWDER, FOR SOLUTION ORAL at 14:29

## 2020-04-02 RX ADMIN — METRONIDAZOLE 500 MG: 500 INJECTION, SOLUTION INTRAVENOUS at 06:18

## 2020-04-02 RX ADMIN — METRONIDAZOLE 500 MG: 500 TABLET, FILM COATED ORAL at 23:33

## 2020-04-02 RX ADMIN — BUPRENORPHINE AND NALOXONE 1 FILM: 8; 2 FILM BUCCAL; SUBLINGUAL at 21:33

## 2020-04-02 ASSESSMENT — PAIN DESCRIPTION - DESCRIPTORS: DESCRIPTORS: PRESSURE;ACHING

## 2020-04-02 ASSESSMENT — PAIN SCALES - GENERAL
PAINLEVEL_OUTOF10: 4
PAINLEVEL_OUTOF10: 7
PAINLEVEL_OUTOF10: 3

## 2020-04-02 ASSESSMENT — PAIN DESCRIPTION - FREQUENCY: FREQUENCY: CONTINUOUS

## 2020-04-02 ASSESSMENT — PAIN DESCRIPTION - ORIENTATION: ORIENTATION: MID

## 2020-04-02 ASSESSMENT — PAIN DESCRIPTION - PROGRESSION: CLINICAL_PROGRESSION: GRADUALLY IMPROVING

## 2020-04-02 ASSESSMENT — PAIN DESCRIPTION - PAIN TYPE: TYPE: ACUTE PAIN

## 2020-04-02 ASSESSMENT — PAIN DESCRIPTION - LOCATION: LOCATION: ABDOMEN

## 2020-04-02 ASSESSMENT — PAIN - FUNCTIONAL ASSESSMENT: PAIN_FUNCTIONAL_ASSESSMENT: ACTIVITIES ARE NOT PREVENTED

## 2020-04-02 ASSESSMENT — PAIN DESCRIPTION - ONSET: ONSET: ON-GOING

## 2020-04-02 NOTE — PROGRESS NOTES
Status Current Every Day Smoker    Packs/day: 0.50    Years: 5.00    Pack years: 2.50    Types: Cigarettes   Smokeless Tobacco Never Used      Family History   Problem Relation Age of Onset    No Known Problems Mother     No Known Problems Father          REVIEW OF SYSTEMS:    No fever / chills / sweats. No weight loss. No visual change, eye pain, eye discharge. No oral lesion, sore throat, dysphagia. Denies cough / sputum/Sob   Denies chest pain, palpitations/ dizziness  Denies nausea/ vomiting/abdominal pain/diarrhea. Denies dysuria or change in urinary function. Denies joint swelling or pain. No myalgia, arthralgia. No rashes, skin lesions   Denies focal weakness, sensory change or other neurologic symptoms  No lymph node swelling or tenderness.     abd pain, nausea, and distension    PHYSICAL EXAM:      Vitals:    /69   Pulse 61   Temp 97.4 °F (36.3 °C) (Oral)   Resp 16   Ht 5' 6\" (1.676 m)   Wt 163 lb 5.8 oz (74.1 kg)   SpO2 93%   BMI 26.37 kg/m²     General Appearance: alert,in no  acute distress, ++ pallor, no icterus heavy tattoo+s  Skin: warm and dry, no rash or erythema  Head: normocephalic and atraumatic  Eyes: pupils equal, round, and reactive to light, conjunctivae normal  ENT: tympanic membrane, external ear and ear canal normal bilaterally, nose without deformity, nasal mucosa and turbinates normal without polyps  Neck: supple and non-tender without mass, no thyromegaly  no cervical lymphadenopathy  Pulmonary/Chest: clear to auscultation bilaterally- no wheezes, rales or rhonchi, normal air movement, no respiratory distress  Cardiovascular: normal rate, regular rhythm, normal S1 and S2, no murmurs, rubs, clicks, or gallops, no carotid bruits  Abdomen: soft, non  -tender, no  distension, normal bowel sounds, no masses or organomegaly  Extremities: no cyanosis, clubbing or edema  Musculoskeletal: normal range of motion, no joint swelling, deformity or tenderness  Neurologic: reflexes normal and symmetric, no cranial nerve deficit  Psych:  Orientation, sensorium, mood normal  Lines:  IV        Data Review:    Lab Results   Component Value Date    WBC 5.9 04/02/2020    HGB 12.3 (L) 04/02/2020    HCT 35.9 (L) 04/02/2020    MCV 89.9 04/02/2020     04/02/2020     Lab Results   Component Value Date    CREATININE 0.8 (L) 04/02/2020    BUN 9 04/02/2020     (H) 04/02/2020    K 3.7 04/02/2020     (H) 04/02/2020    CO2 24 04/02/2020       Hepatic Function Panel:   Lab Results   Component Value Date    ALKPHOS 52 04/01/2020    ALT 54 04/01/2020    AST 61 04/01/2020    PROT 6.5 04/01/2020    BILITOT <0.2 04/01/2020    LABALBU 3.5 04/01/2020       UA:  Lab Results   Component Value Date    COLORU DARK YELLOW 04/01/2020    CLARITYU Clear 04/01/2020    GLUCOSEU Negative 04/01/2020    BILIRUBINUR SMALL 04/01/2020    KETUA Negative 04/01/2020    SPECGRAV 1.020 04/01/2020    BLOODU Negative 04/01/2020    PHUR 7.0 04/01/2020    PROTEINU 30 04/01/2020    UROBILINOGEN 2.0 04/01/2020    NITRU Negative 04/01/2020    LEUKOCYTESUR Negative 04/01/2020    LABMICR YES 04/01/2020    URINETYPE Voided 04/01/2020      Urine Microscopic:   Lab Results   Component Value Date    BACTERIA Rare 04/01/2020    WBCUA 0-2 04/01/2020    RBCUA 0-2 04/01/2020    EPIU 0-1 04/01/2020       MICRO: cultures reviewed and updated by me   Procedure Component Value Units Date/Time   GI Bacterial Pathogens By PCR [[id number]] Collected: 04/02/20 1119   Order Status: Sent Specimen: Stool Updated: 04/02/20 1127       4/1/2020 10:42 AM - Melina Preston Incoming Lab Results From Soft (Epic Adt)      Component Value Ref Range & Units Status Collected Lab   Amphetamine Screen, Urine Neg  Negative <1000ng/mL Final 04/01/2020 10:20 AM [facility]   Barbiturate Screen, Ur Neg  Negative <200 ng/mL Final 04/01/2020 10:20 AM  - [facility] Lab   Benzodiazepine Screen, Urine Neg  Negative <200 ng/mL Final all the pertinent results from current hospitalization and  care every where were reviewed  by me as a part of the evaluation   Plan:   1. Cont IV Cipro as in  Patient   2. Cont IV Flagyl as in patient   3. Amylase and Lipase normal   4. Cont  BIKTARVY as before after d/c   5. Cont supportive care   6. [place] for d/c in AM if no new issues do not anticipate any antibiotic therapy given lack of fevers and normal wbc   7. Will sign off    8. He will contact his HIV care provider at [facility] for medication refills     Discussed with patient/Family and Nursing staff     Thanks for allowing me to participate in your patient's care and please call me with any questions or concerns.     Anita Maxwell MD  Infectious Disease  [facility]) Physician  Phone: [phone]   Fax : [phone]

## 2020-04-02 NOTE — PROGRESS NOTES
NG removed per order, pt provided with clears, jello and lemon lime soda to try will advance per order if he tolerates clears.  Electronically signed by Siegfried Holter, RN on 4/2/2020 at 12:47 PM

## 2020-04-02 NOTE — PROGRESS NOTES
Pt. Resting in bed on right side comfortably. Shift assessment complete. NG in place and secured, on low wall suction, output recorded and marked. Pt A&Ox4. Call light in reach. Bedside table in reach, bed in low position and bed alarm on. Will continue to monitor.

## 2020-04-02 NOTE — PROGRESS NOTES
Gastroenterology Progress Note    Sweta Shields is a 26 y.o. male patient. Principal Problem:    Regional enteritis of small bowel ([address]  Active Problems:    HIV positive (HCC)    IV drug user    Chronic hepatitis C without hepatic coma (HCC)    MRSA colonization    Intractable nausea and vomiting    Intractable abdominal pain    SBO (small bowel obstruction) (HCC)    Cocaine use    Cannabis abuse    Transaminitis    Hypernatremia    Periumbilical abdominal pain  Resolved Problems:    * No resolved hospital problems. *      SUBJECTIVE:  His abdominal pain has improved. NO nausea or vomiting. NO fevers.   Tolerating clears    Current Facility-Administered Medications: ketorolac (TORADOL) injection 30 mg, 30 mg, Intravenous, Q6H PRN  polyethylene glycol (GLYCOLAX) packet 17 g, 17 g, Oral, BID  dextrose 5 % solution, , Intravenous, Continuous  sodium chloride flush 0.9 % injection 10 mL, 10 mL, Intravenous, 2 times per day  sodium chloride flush 0.9 % injection 10 mL, 10 mL, Intravenous, PRN  acetaminophen (TYLENOL) tablet 650 mg, 650 mg, Oral, Q6H PRN **OR** acetaminophen (TYLENOL) suppository 650 mg, 650 mg, Rectal, Q6H PRN  polyethylene glycol (GLYCOLAX) packet 17 g, 17 g, Oral, Daily PRN  promethazine (PHENERGAN) tablet 12.5 mg, 12.5 mg, Oral, Q6H PRN **OR** ondansetron (ZOFRAN) injection 4 mg, 4 mg, Intravenous, Q6H PRN  enoxaparin (LOVENOX) injection 40 mg, 40 mg, Subcutaneous, Daily  potassium chloride 10 mEq/100 mL IVPB (Peripheral Line), 10 mEq, Intravenous, PRN  magnesium sulfate 2 g in 50 mL IVPB premix, 2 g, Intravenous, PRN  LORazepam (ATIVAN) injection 0.5 mg, 0.5 mg, Intravenous, Q4H PRN  ciprofloxacin (CIPRO) IVPB 400 mg, 400 mg, Intravenous, Q12H  metronidazole (FLAGYL) 500 mg in NaCl 100 mL IVPB premix, 500 mg, Intravenous, Q8H    Physical    VITALS:  /69   Pulse 61   Temp 97.4 °F (36.3 °C) (Oral)   Resp 16   Ht 5' 6\" (1.676 m)   Wt 163 lb 5.8 oz (74.1 kg)   SpO2 93%   BMI 26.37 tox screen positive for cannabinoid and cocaine. He has been placed on empiric IV Cipro and Flagyl. Stool studies ordered but patient has not had a bowel movement yet to collect a sample. NG tube placed this afternoon due to intractable nausea vomiting. Imp: 1. nausea, vomiting, abdominal pain, and small bowel enteritis on CT. Acuity of onset suggests ischemic or infectious etiologies. Cocaine does increase risk of ischemia. Doubt Crohn's which normally has an insidious onset.     Plan: Doing much better  Advance diet slowly  Cipro/flagyl with length per ID  MRE in 4-6 weeks to document resolution. Told him my concern that cocaine led to vasospasm and ischemic enteritis that can be life-threatening and he should avoid cocaine going forward. He agreed he was never going to do cocaine again. Thank you for allowing me to participate in the care of your patient. Please feel free to contact me with any concerns.   [address], MD

## 2020-04-03 VITALS
DIASTOLIC BLOOD PRESSURE: 68 MMHG | BODY MASS INDEX: 26.71 KG/M2 | RESPIRATION RATE: 16 BRPM | WEIGHT: 166.23 LBS | TEMPERATURE: 97.6 F | OXYGEN SATURATION: 93 % | HEART RATE: 66 BPM | HEIGHT: 66 IN | SYSTOLIC BLOOD PRESSURE: 111 MMHG

## 2020-04-03 LAB
ANION GAP SERPL CALCULATED.3IONS-SCNC: 9 MMOL/L (ref 3–16)
BASOPHILS ABSOLUTE: 0 K/UL (ref 0–0.2)
BASOPHILS RELATIVE PERCENT: 0.5 %
BUN BLDV-MCNC: 7 MG/DL (ref 7–20)
CALCIUM SERPL-MCNC: 8.2 MG/DL (ref 8.3–10.6)
CHLORIDE BLD-SCNC: 102 MMOL/L (ref 99–110)
CO2: 27 MMOL/L (ref 21–32)
CREAT SERPL-MCNC: 0.7 MG/DL (ref 0.9–1.3)
EOSINOPHILS ABSOLUTE: 0 K/UL (ref 0–0.6)
EOSINOPHILS RELATIVE PERCENT: 0.3 %
GFR AFRICAN AMERICAN: >60
GFR NON-AFRICAN AMERICAN: >60
GI BACTERIAL PATHOGENS BY PCR: NORMAL
GLUCOSE BLD-MCNC: 94 MG/DL (ref 70–99)
HCT VFR BLD CALC: 37.4 % (ref 40.5–52.5)
HEMOGLOBIN: 12.9 G/DL (ref 13.5–17.5)
LYMPHOCYTES ABSOLUTE: 1.4 K/UL (ref 1–5.1)
LYMPHOCYTES RELATIVE PERCENT: 29.3 %
MCH RBC QN AUTO: 31 PG (ref 26–34)
MCHC RBC AUTO-ENTMCNC: 34.4 G/DL (ref 31–36)
MCV RBC AUTO: 90.2 FL (ref 80–100)
MONOCYTES ABSOLUTE: 0.5 K/UL (ref 0–1.3)
MONOCYTES RELATIVE PERCENT: 9.5 %
NEUTROPHILS ABSOLUTE: 2.9 K/UL (ref 1.7–7.7)
NEUTROPHILS RELATIVE PERCENT: 60.4 %
PDW BLD-RTO: 14.8 % (ref 12.4–15.4)
PLATELET # BLD: 187 K/UL (ref 135–450)
PMV BLD AUTO: 8.7 FL (ref 5–10.5)
POTASSIUM REFLEX MAGNESIUM: 3.7 MMOL/L (ref 3.5–5.1)
RBC # BLD: 4.14 M/UL (ref 4.2–5.9)
SODIUM BLD-SCNC: 138 MMOL/L (ref 136–145)
WBC # BLD: 4.8 K/UL (ref 4–11)

## 2020-04-03 PROCEDURE — 36415 COLL VENOUS BLD VENIPUNCTURE: CPT

## 2020-04-03 PROCEDURE — 6370000000 HC RX 637 (ALT 250 FOR IP): Performed by: INTERNAL MEDICINE

## 2020-04-03 PROCEDURE — 6360000002 HC RX W HCPCS: Performed by: INTERNAL MEDICINE

## 2020-04-03 PROCEDURE — 80048 BASIC METABOLIC PNL TOTAL CA: CPT

## 2020-04-03 PROCEDURE — APPNB30 APP NON BILLABLE TIME 0-30 MINS: Performed by: NURSE PRACTITIONER

## 2020-04-03 PROCEDURE — APPSS30 APP SPLIT SHARED TIME 16-30 MINUTES: Performed by: NURSE PRACTITIONER

## 2020-04-03 PROCEDURE — 85025 COMPLETE CBC W/AUTO DIFF WBC: CPT

## 2020-04-03 PROCEDURE — 94760 N-INVAS EAR/PLS OXIMETRY 1: CPT

## 2020-04-03 RX ORDER — METRONIDAZOLE 500 MG/1
500 TABLET ORAL EVERY 8 HOURS SCHEDULED
Qty: 15 TABLET | Refills: 0 | Status: SHIPPED | OUTPATIENT
Start: 2020-04-03 | End: 2020-04-08

## 2020-04-03 RX ORDER — POLYETHYLENE GLYCOL 3350 17 G/17G
17 POWDER, FOR SOLUTION ORAL 2 TIMES DAILY
Qty: 60 EACH | Refills: 0 | Status: SHIPPED | OUTPATIENT
Start: 2020-04-03 | End: 2020-05-03

## 2020-04-03 RX ORDER — CIPROFLOXACIN 500 MG/1
500 TABLET, FILM COATED ORAL EVERY 12 HOURS SCHEDULED
Qty: 10 TABLET | Refills: 0 | Status: SHIPPED | OUTPATIENT
Start: 2020-04-03 | End: 2020-04-08

## 2020-04-03 RX ADMIN — ENOXAPARIN SODIUM 40 MG: 40 INJECTION SUBCUTANEOUS at 08:15

## 2020-04-03 RX ADMIN — CIPROFLOXACIN 500 MG: 500 TABLET, FILM COATED ORAL at 08:16

## 2020-04-03 RX ADMIN — METRONIDAZOLE 500 MG: 500 TABLET, FILM COATED ORAL at 06:47

## 2020-04-03 RX ADMIN — BUPRENORPHINE AND NALOXONE 1 FILM: 8; 2 FILM BUCCAL; SUBLINGUAL at 08:15

## 2020-04-03 ASSESSMENT — PAIN SCALES - GENERAL
PAINLEVEL_OUTOF10: 0
PAINLEVEL_OUTOF10: 0

## 2020-04-03 ASSESSMENT — PAIN SCALES - WONG BAKER
WONGBAKER_NUMERICALRESPONSE: 0
WONGBAKER_NUMERICALRESPONSE: 0

## 2020-04-03 NOTE — CARE COORDINATION
Discharge Plan:  TONY spoke with Patient this morning, he called [facility] and they are closed until Monday. Spoke with [facility] and is able to come but needs ID. TONY spoke with Mindy Corado from [facility] she will call him directly and assist him with admission to [facility] for today, if patient is unable to discharge Today, he will need medication for weekend upon discharge to be seen at [facility] on Monday. Can be seen at [facility] before 3pm today. SW available as needed.   Danny De Anda [facility],  [phone]

## 2020-04-03 NOTE — DISCHARGE INSTR - COC
abdominal pain R10.33       Isolation/Infection:   Isolation          No Isolation        Patient Infection Status     Infection Onset Added Last Indicated Last Indicated By Review Planned Expiration Resolved Resolved By    None active    Resolved    C-diff Rule Out 04/01/20 04/01/20 04/02/20 GI Bacterial Pathogens By PCR (Ordered)   04/01/20 Sayra Amezquita RN    MRSA 08/10/19 08/11/19 08/10/19 MRSA DNA Probe, Nasal   04/01/20 Sayra Amezquita RN    8/10/19 nares          Nurse Assessment:  Last Vital Signs: /68   Pulse 66   Temp 97.6 °F (36.4 °C) (Oral)   Resp 16   Ht 5' 6\" (1.676 m)   Wt 166 lb 3.6 oz (75.4 kg)   SpO2 93%   BMI 26.83 kg/m²     Last documented pain score (0-10 scale): Pain Level: 0  Last Weight:   Wt Readings from Last 1 Encounters:   04/03/20 166 lb 3.6 oz (75.4 kg)     Mental Status:  {IP PT MENTAL STATUS:20030}    IV Access:  { MÓNICA IV ACCESS:525743368}    Nursing Mobility/ADLs:  Walking   {P DME ZJDC:882731541}  Transfer  {P DME UXBC:317751052}  Bathing  {P DME GICN:527709298}  Dressing  {P DME KBDH:292601315}  Toileting  {P DME UKLE:504668150}  Feeding  {[facility] DME JJPN:838801649}  Med Admin  {[facility] DME BVBM:193486888}  Med Delivery   { MÓNICA MED Delivery:221468544}    Wound Care Documentation and Therapy:        Elimination:  Continence:   · Bowel: {YES / GK:07793}  · Bladder: {YES / QV:15606}  Urinary Catheter: {Urinary Catheter:464997411}   Colostomy/Ileostomy/Ileal Conduit: {YES / LQ:10788}       Date of Last BM: ***    Intake/Output Summary (Last 24 hours) at 4/3/2020 1007  Last data filed at 4/3/2020 0656  Gross per 24 hour   Intake 972 ml   Output 200 ml   Net 772 ml     I/O last 3 completed shifts:   In: 2095 [P.O.:480; I.V.:1615]  Out: 550 [Urine:550]    Safety Concerns:     [address] Safety Concerns:736681970}    Impairments/Disabilities:      [address] Impairments/Disabilities:316003651}    Nutrition Therapy:  Current Nutrition Therapy:   [address] [employer] Diet URQW:879364794}    Routes of Feeding: {CHP DME Other Feedings:342067559}  Liquids: {Slp liquid thickness:05500}  Daily Fluid Restriction: {CHP DME Yes amt example:873962702}  Last Modified Barium Swallow with Video (Video Swallowing Test): {Done Not Done YMZS:048666727}    Treatments at the Time of Hospital Discharge:   Respiratory Treatments: ***  Oxygen Therapy:  {Therapy; copd oxygen:71131}  Ventilator:    {MH CC Vent BGYQ:023916180}    Rehab Therapies: {THERAPEUTIC INTERVENTION:8362218475}  Weight Bearing Status/Restrictions: 50Joi RUBIO Weight Bearin}  Other Medical Equipment (for information only, NOT a DME order):  {EQUIPMENT:686731647}  Other Treatments: ***    Patient's personal belongings (please select all that are sent with patient):  None    RN SIGNATURE:  Electronically signed by Jaquelin Aldridge RN on 4/3/20 at 10:08 AM EDT    CASE MANAGEMENT/SOCIAL WORK SECTION    Inpatient Status Date: ***    Readmission Risk Assessment Score:  Readmission Risk              Risk of Unplanned Readmission:        12           Discharging to Facility/ Agency   · Name:   · Address:  · Phone:  · Fax:    Dialysis Facility (if applicable)   · Name:  · Address:  · Dialysis Schedule:  · Phone:  · Fax:    / signature: {Esignature:475347933}    PHYSICIAN SECTION    Prognosis: {Prognosis:4442795643}    Condition at Discharge: [address] Patient Condition:534952724}    Rehab Potential (if transferring to Rehab): {Prognosis:8356513030}    Recommended Labs or Other Treatments After Discharge: ***    Physician Certification: I certify the above information and transfer of Renita Mauricio  is necessary for the continuing treatment of the diagnosis listed and that he requires {Admit to Appropriate Level of Care:77193} for {GREATER/LESS:692300768} 30 days.      Update Admission H&P: {CHP DME Changes in ZVIQF:970955435}    PHYSICIAN SIGNATURE:  {Esignature:046984641}

## 2020-04-03 NOTE — PROGRESS NOTES
Pt. Resting comfortably in bed. Shift assessment completed and evening medications given. IV discontinued and removed per order. Pt. Reports no pain or needs at this time. Call light in reach. Bedside table in reach. Will continue to monitor.

## 2020-04-03 NOTE — PROGRESS NOTES
Pt resting comfortably in bed. Morning medication given. Pt reports no further needs at this time. Call light in reach. Bedside table in reach. Will continue to monitor.

## 2020-04-03 NOTE — DISCHARGE SUMMARY
the following most recent labs are provided:    Lab Results   Component Value Date    WBC 4.8 04/03/2020    HGB 12.9 04/03/2020    HCT 37.4 04/03/2020    MCV 90.2 04/03/2020     04/03/2020     04/03/2020    K 3.7 04/03/2020     04/03/2020    CO2 27 04/03/2020    BUN 7 04/03/2020    CREATININE 0.7 04/03/2020    CALCIUM 8.2 04/03/2020    ALKPHOS 52 04/01/2020    ALT 54 04/01/2020    AST 61 04/01/2020    BILITOT <0.2 04/01/2020    LABALBU 3.5 04/01/2020     No results found for: INR    Radiology:  Ct Abdomen Pelvis Wo Contrast Additional Contrast? None    Result Date: 4/1/2020  EXAMINATION: CT OF THE ABDOMEN AND PELVIS WITHOUT CONTRAST 4/1/2020 6:46 am TECHNIQUE: CT of the abdomen and pelvis was performed without the administration of intravenous contrast. Multiplanar reformatted images are provided for review. Dose modulation, iterative reconstruction, and/or weight based adjustment of the mA/kV was utilized to reduce the radiation dose to as low as reasonably achievable. COMPARISON: None. HISTORY: ORDERING SYSTEM PROVIDED HISTORY: periumbilical and back/flank pain TECHNOLOGIST PROVIDED HISTORY: Reason for exam:->periumbilical and back/flank pain Additional Contrast?->None Reason for Exam: Abdominal Pain (states his stomach hurts when he tries to have a BM since 3am) periumbilical and back/flank pain Acuity: Acute Type of Exam: Initial FINDINGS: Lower Chest: Visualized lung bases are clear without focal airspace consolidation, sizeable effusion, or pneumothorax. No definite pulmonary nodules or masses. Base of the heart is normal in size without pericardial fluid collection. Organs: The liver, gallbladder, pancreas, and spleen are grossly within normal limits. Focal fatty replacement of the liver adjacent to the falciform ligament. No evidence for intrahepatic or extrahepatic biliary ductal dilatation. GI/Bowel: No bowel obstruction identified.   There is generalized wall thickening and mesenteric inflammation/edema within the left lower quadrant involving multiple small bowel loops, most compatible with infectious or inflammatory small bowel enteritis. Definite colonic inflammation identified. No free intraperitoneal air identified to suggest viscus perforation. Small amount of pelvic free fluid. Pelvis: No evidence for free fluid. Peritoneum/Retroperitoneum: Adrenal glands are normal in size and configuration. The kidneys are normal in size without definite nephrolithiasis or hydronephrosis. The ureters run a nonobstructed course to a normal-appearing urinary bladder. Vasculature: The aorta is normal in caliber. No definite lymphadenopathy identified. Bones/Soft Tissues: No evidence for acute fracture or dislocation. No lytic or blastic lesions. No evidence for nephrolithiasis or urinary obstruction. Generalized wall thickening and mesenteric edema/inflammation within the left lower quadrant involving multiple continuous small bowel loops. Please note that evaluation is somewhat suboptimal without the benefit of intravenous and oral contrast.  Findings are most compatible with focal small-bowel enteritis however partial/intermittent bowel obstruction is a diagnostic consideration. Xr Abdomen (kub) (single Ap View)    Result Date: 4/2/2020  EXAMINATION: ONE SUPINE XRAY VIEW(S) OF THE ABDOMEN 4/2/2020 7:40 am COMPARISON: Abdominal CT 04/01/2020 HISTORY: ORDERING SYSTEM PROVIDED HISTORY: Abdominal pain, SBO TECHNOLOGIST PROVIDED HISTORY: Reason for exam:->Abdominal pain, SBO Reason for Exam: Abdominal pain, SBO Acuity: Acute Type of Exam: Ongoing FINDINGS: Supine views of the abdomen and pelvis were performed. There is no evidence of free air. There is a nasogastric tube in place with both tip and side port overlying the left upper quadrant, likely lying within the stomach. There is stool and gas interspersed throughout the colon, with a large fecal load present.   There is a
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
## 2020-04-04 ENCOUNTER — CARE COORDINATION (OUTPATIENT)
Dept: CASE MANAGEMENT | Age: 30
End: 2020-04-04

## 2020-04-06 ENCOUNTER — CARE COORDINATION (OUTPATIENT)
Dept: CASE MANAGEMENT | Age: 30
End: 2020-04-06

## 2020-04-06 NOTE — CARE COORDINATION
[facility] Merged with [facility] ED Follow Up Call    2020    Patient: Sasha Khan Patient : 1990   MRN: <[id number]>  Reason for Admission: Abd Flank Pain  Discharge Date: 4/3/20       Care Transitions ED Follow Up    Care Transitions Interventions            HIV patient therefore CoVID19 at risk patient.   Unable to contact via phone- not accepting messages      Saul Seaman RN

## 2020-04-22 ENCOUNTER — CARE COORDINATION (OUTPATIENT)
Dept: CASE MANAGEMENT | Age: 30
End: 2020-04-22

## 2020-05-14 NOTE — PLAN OF CARE
Problem: Falls - Risk of:  Goal: Will remain free from falls  Description  Will remain free from falls  8/12/2019 2328 by Zack Lu RN  Outcome: Ongoing  Note:   Patient educated on fall prevention. Call light is within reach, bed locked in lowest position, personal items within reach, and bed alarm is on. Will round on patient per unit guidelines. Problem: Falls - Risk of:  Goal: Absence of physical injury  Description  Absence of physical injury  Outcome: Ongoing  Note:   Bed in lowest position, wheels locked, bed/chair exit alarm in place, call light within reach, and non skid footwear on. Walkway free of clutter. Pt alert and oriented and able to make needs known. Pt educated to use call light when needing to get up, and pt utilizes call light to make needs known. Will continue to monitor. Problem: Pain:  Goal: Pain level will decrease  Description  Pain level will decrease  8/12/2019 2328 by Zakc Lu RN  Outcome: Ongoing  Note:   Pain /discomfort being managed with PRN analgesics per MD orders. Patient able to express presence and absence of pain and rate pain appropriately using numerical scale. Problem: Pain:  Goal: Control of acute pain  Description  Control of acute pain  Outcome: Ongoing  Note:   Pain /discomfort being managed with PRN analgesics per MD orders. Patient able to express presence and absence of pain and rate pain appropriately using numerical scale. Problem: Pain:  Goal: Control of chronic pain  Description  Control of chronic pain  Outcome: Ongoing  Note:   Pain /discomfort being managed with PRN analgesics per MD orders. Patient able to express presence and absence of pain and rate pain appropriately using numerical scale.
Problem: Falls - Risk of:  Goal: Will remain free from falls  Description  Will remain free from falls  Outcome: Ongoing  Note:   Fall risk assessment completed. Fall precautions in place. Bed in lowest position, wheels locked, bed/chair exit alarm in place, call light within reach, and non skid footwear on. Walkway free of clutter. Pt alert and oriented and able to make needs known. Pt educated to use call light when needing to get up, and pt utilizes call light to make needs known. Will continue to monitor.      Goal: Absence of physical injury  Description  Absence of physical injury  Outcome: Ongoing   Electronically signed by Rodrigo Sparks RN on 8/10/2019 at 2:36 PM
within normal limits

## 2020-06-09 ENCOUNTER — APPOINTMENT (OUTPATIENT)
Dept: GENERAL RADIOLOGY | Age: 30
End: 2020-06-09
Payer: COMMERCIAL

## 2020-06-09 ENCOUNTER — APPOINTMENT (OUTPATIENT)
Dept: CT IMAGING | Age: 30
End: 2020-06-09
Payer: COMMERCIAL

## 2020-06-09 ENCOUNTER — TELEPHONE (OUTPATIENT)
Dept: ORTHOPEDIC SURGERY | Age: 30
End: 2020-06-09

## 2020-06-09 ENCOUNTER — HOSPITAL ENCOUNTER (EMERGENCY)
Age: 30
Discharge: HOME | End: 2020-06-09
Attending: EMERGENCY MEDICINE
Payer: COMMERCIAL

## 2020-06-09 VITALS
SYSTOLIC BLOOD PRESSURE: 155 MMHG | RESPIRATION RATE: 18 BRPM | HEART RATE: 103 BPM | HEIGHT: 66 IN | DIASTOLIC BLOOD PRESSURE: 81 MMHG | TEMPERATURE: 97.4 F | WEIGHT: 145.06 LBS | BODY MASS INDEX: 23.31 KG/M2 | OXYGEN SATURATION: 95 %

## 2020-06-09 PROCEDURE — 6370000000 HC RX 637 (ALT 250 FOR IP): Performed by: EMERGENCY MEDICINE

## 2020-06-09 PROCEDURE — 73110 X-RAY EXAM OF WRIST: CPT

## 2020-06-09 PROCEDURE — 99284 EMERGENCY DEPT VISIT MOD MDM: CPT

## 2020-06-09 PROCEDURE — 73630 X-RAY EXAM OF FOOT: CPT

## 2020-06-09 PROCEDURE — 73610 X-RAY EXAM OF ANKLE: CPT

## 2020-06-09 PROCEDURE — 71046 X-RAY EXAM CHEST 2 VIEWS: CPT

## 2020-06-09 PROCEDURE — 29125 APPL SHORT ARM SPLINT STATIC: CPT

## 2020-06-09 PROCEDURE — 73130 X-RAY EXAM OF HAND: CPT

## 2020-06-09 PROCEDURE — 70450 CT HEAD/BRAIN W/O DYE: CPT

## 2020-06-09 PROCEDURE — 74176 CT ABD & PELVIS W/O CONTRAST: CPT

## 2020-06-09 RX ORDER — MELOXICAM 7.5 MG/1
7.5 TABLET ORAL DAILY
Qty: 90 TABLET | Refills: 1 | Status: SHIPPED | OUTPATIENT
Start: 2020-06-09

## 2020-06-09 RX ORDER — ACETAMINOPHEN 325 MG/1
650 TABLET ORAL ONCE
Status: COMPLETED | OUTPATIENT
Start: 2020-06-09 | End: 2020-06-09

## 2020-06-09 RX ADMIN — ACETAMINOPHEN 650 MG: 325 TABLET ORAL at 14:25

## 2020-06-09 ASSESSMENT — PAIN SCALES - GENERAL
PAINLEVEL_OUTOF10: 10
PAINLEVEL_OUTOF10: 10

## 2020-06-09 ASSESSMENT — PAIN DESCRIPTION - DESCRIPTORS: DESCRIPTORS: THROBBING

## 2020-06-09 ASSESSMENT — PAIN DESCRIPTION - FREQUENCY: FREQUENCY: CONTINUOUS

## 2020-06-09 NOTE — ED NOTES
Wheeled pt in [facility] from [address] [facility] to ED bed. Obtained VS. Pt wearing mask, medic wearing mask, gloves, safety glasses, and face shield.      Elena Pimentel, EMT-P  06/09/20 7735

## 2020-06-09 NOTE — ED NOTES
AVS reviewed with patient. Verbalized understanding. AVS was printed and given to patient. Printed prescription given to patient.      Maria L Amado RN  06/09/20 1430

## 2020-06-09 NOTE — ED NOTES
Patient requests crutches. Crutches fitted and crutch walking demo given. Return demo correctly.      Thiago Chacon RN  06/09/20 5338
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
## 2021-10-10 ENCOUNTER — HOSPITAL ENCOUNTER (EMERGENCY)
Age: 31
Discharge: HOME | End: 2021-10-10
Payer: COMMERCIAL

## 2021-10-10 ENCOUNTER — APPOINTMENT (OUTPATIENT)
Dept: GENERAL RADIOLOGY | Age: 31
End: 2021-10-10
Payer: COMMERCIAL

## 2021-10-10 VITALS
HEIGHT: 66 IN | RESPIRATION RATE: 15 BRPM | OXYGEN SATURATION: 96 % | WEIGHT: 147.05 LBS | HEART RATE: 55 BPM | TEMPERATURE: 97.3 F | SYSTOLIC BLOOD PRESSURE: 118 MMHG | BODY MASS INDEX: 23.63 KG/M2 | DIASTOLIC BLOOD PRESSURE: 67 MMHG

## 2021-10-10 DIAGNOSIS — L03.012 PARONYCHIA OF FINGER OF LEFT HAND: Primary | ICD-10-CM

## 2021-10-10 PROCEDURE — 99283 EMERGENCY DEPT VISIT LOW MDM: CPT

## 2021-10-10 PROCEDURE — 26010 DRAINAGE OF FINGER ABSCESS: CPT

## 2021-10-10 PROCEDURE — 73130 X-RAY EXAM OF HAND: CPT

## 2021-10-10 RX ORDER — SULFAMETHOXAZOLE AND TRIMETHOPRIM 800; 160 MG/1; MG/1
1 TABLET ORAL 2 TIMES DAILY
Qty: 20 TABLET | Refills: 0 | Status: SHIPPED | OUTPATIENT
Start: 2021-10-10 | End: 2021-10-20

## 2021-10-10 RX ORDER — CEPHALEXIN 500 MG/1
500 CAPSULE ORAL 4 TIMES DAILY
Qty: 40 CAPSULE | Refills: 0 | Status: SHIPPED | OUTPATIENT
Start: 2021-10-10

## 2021-10-10 ASSESSMENT — ENCOUNTER SYMPTOMS
VOMITING: 0
NAUSEA: 0

## 2021-10-10 ASSESSMENT — PAIN DESCRIPTION - PAIN TYPE: TYPE: ACUTE PAIN

## 2021-10-10 ASSESSMENT — PAIN SCALES - GENERAL: PAINLEVEL_OUTOF10: 6
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
## 2021-12-15 ENCOUNTER — HOSPITAL ENCOUNTER (EMERGENCY)
Age: 31
Discharge: HOME | End: 2021-12-15
Payer: COMMERCIAL

## 2021-12-15 VITALS
DIASTOLIC BLOOD PRESSURE: 72 MMHG | OXYGEN SATURATION: 94 % | HEIGHT: 66 IN | HEART RATE: 82 BPM | WEIGHT: 147.05 LBS | BODY MASS INDEX: 23.63 KG/M2 | SYSTOLIC BLOOD PRESSURE: 131 MMHG | TEMPERATURE: 100.3 F | RESPIRATION RATE: 16 BRPM

## 2021-12-15 DIAGNOSIS — R11.0 NAUSEA: ICD-10-CM

## 2021-12-15 DIAGNOSIS — Z20.822 COVID-19 VIRUS TEST RESULT UNKNOWN: ICD-10-CM

## 2021-12-15 DIAGNOSIS — J06.9 VIRAL URI: Primary | ICD-10-CM

## 2021-12-15 LAB
RAPID INFLUENZA  B AGN: NEGATIVE
RAPID INFLUENZA A AGN: NEGATIVE
SARS-COV-2, NAAT: NOT DETECTED

## 2021-12-15 PROCEDURE — 99283 EMERGENCY DEPT VISIT LOW MDM: CPT

## 2021-12-15 PROCEDURE — U0005 INFEC AGEN DETEC AMPLI PROBE: HCPCS

## 2021-12-15 PROCEDURE — 6370000000 HC RX 637 (ALT 250 FOR IP): Performed by: PHYSICIAN ASSISTANT

## 2021-12-15 PROCEDURE — 6360000002 HC RX W HCPCS: Performed by: PHYSICIAN ASSISTANT

## 2021-12-15 PROCEDURE — 87804 INFLUENZA ASSAY W/OPTIC: CPT

## 2021-12-15 PROCEDURE — U0003 INFECTIOUS AGENT DETECTION BY NUCLEIC ACID (DNA OR RNA); SEVERE ACUTE RESPIRATORY SYNDROME CORONAVIRUS 2 (SARS-COV-2) (CORONAVIRUS DISEASE [COVID-19]), AMPLIFIED PROBE TECHNIQUE, MAKING USE OF HIGH THROUGHPUT TECHNOLOGIES AS DESCRIBED BY CMS-2020-01-R: HCPCS

## 2021-12-15 PROCEDURE — 87635 SARS-COV-2 COVID-19 AMP PRB: CPT

## 2021-12-15 PROCEDURE — 96372 THER/PROPH/DIAG INJ SC/IM: CPT

## 2021-12-15 RX ORDER — ONDANSETRON 4 MG/1
4 TABLET, ORALLY DISINTEGRATING ORAL EVERY 8 HOURS PRN
Qty: 20 TABLET | Refills: 0 | Status: SHIPPED | OUTPATIENT
Start: 2021-12-15

## 2021-12-15 RX ORDER — FLUTICASONE PROPIONATE 50 MCG
2 SPRAY, SUSPENSION (ML) NASAL DAILY
Qty: 16 G | Refills: 0 | Status: SHIPPED | OUTPATIENT
Start: 2021-12-15

## 2021-12-15 RX ORDER — ONDANSETRON 4 MG/1
4 TABLET, ORALLY DISINTEGRATING ORAL ONCE
Status: COMPLETED | OUTPATIENT
Start: 2021-12-15 | End: 2021-12-15

## 2021-12-15 RX ORDER — KETOROLAC TROMETHAMINE 30 MG/ML
30 INJECTION, SOLUTION INTRAMUSCULAR; INTRAVENOUS ONCE
Status: COMPLETED | OUTPATIENT
Start: 2021-12-15 | End: 2021-12-15

## 2021-12-15 RX ORDER — GUAIFENESIN 600 MG/1
600 TABLET, EXTENDED RELEASE ORAL 2 TIMES DAILY
Qty: 30 TABLET | Refills: 0 | Status: SHIPPED | OUTPATIENT
Start: 2021-12-15 | End: 2021-12-30

## 2021-12-15 RX ORDER — ACETAMINOPHEN 500 MG
1000 TABLET ORAL ONCE
Status: COMPLETED | OUTPATIENT
Start: 2021-12-15 | End: 2021-12-15

## 2021-12-15 RX ORDER — ALBUTEROL SULFATE 90 UG/1
2 AEROSOL, METERED RESPIRATORY (INHALATION) 4 TIMES DAILY PRN
Qty: 18 G | Refills: 0 | Status: SHIPPED | OUTPATIENT
Start: 2021-12-15

## 2021-12-15 RX ADMIN — ACETAMINOPHEN 1000 MG: 500 TABLET ORAL at 13:39

## 2021-12-15 RX ADMIN — KETOROLAC TROMETHAMINE 30 MG: 30 INJECTION, SOLUTION INTRAMUSCULAR at 13:38

## 2021-12-15 RX ADMIN — ONDANSETRON 4 MG: 4 TABLET, ORALLY DISINTEGRATING ORAL at 13:39

## 2021-12-15 ASSESSMENT — PAIN DESCRIPTION - PAIN TYPE: TYPE: ACUTE PAIN

## 2021-12-15 ASSESSMENT — PAIN DESCRIPTION - LOCATION: LOCATION: BACK

## 2021-12-15 ASSESSMENT — PAIN DESCRIPTION - ORIENTATION: ORIENTATION: LOWER

## 2021-12-15 ASSESSMENT — PAIN SCALES - GENERAL: PAINLEVEL_OUTOF10: 7

## 2021-12-15 NOTE — ED PROVIDER NOTES
swelling, deformity or signs of injury. Normal range of motion. Cervical back: Normal range of motion and neck supple. No rigidity or tenderness. Comments: Generalized muscle aches throughout   Lymphadenopathy:      Cervical: No cervical adenopathy. Skin:     General: Skin is warm and dry. Capillary Refill: Capillary refill takes less than 2 seconds. Findings: No rash. Neurological:      Mental Status: He is alert and oriented to person, place, and time. Mental status is at baseline. Psychiatric:         Mood and Affect: Mood normal.         Behavior: Behavior normal.         MEDICAL DECISION MAKING    Vitals:    Vitals:    12/15/21 1253   BP: 131/72   Pulse: 82   Resp: 16   Temp: 103.1 °F (39.5 °C)   TempSrc: Oral   SpO2: 94%   Weight: 147 lb 0.8 oz (66.7 kg)   Height: 5' 6\" (1.676 m)       LABS:  Labs Reviewed   RAPID INFLUENZA A/B ANTIGENS    Narrative:     Performed at:  [address] [address]   Phone ([phone] RAPID    Narrative:     Performed at:  [address] [address]   Phone [phone]            RADIOLOGY:   Non-plain film images such as CT, Ultrasound and MRI are read by the radiologist. Teddy Magaña PA-C have directly visualized the radiologic plain film image(s) with the below findings:      Interpretation per the Radiologist below, if available at the time of this note:    No orders to display        No results found.        MEDICAL DECISION MAKING / ED COURSE:      PROCEDURES:   Procedures    None    Patient was given:  Medications   ondansetron (ZOFRAN-ODT) disintegrating tablet 4 mg (4 mg Oral Given 12/15/21 1339)   ketorolac (TORADOL) injection 30 mg (30 mg IntraMUSCular Given 12/15/21 1338)   acetaminophen (TYLENOL) tablet 1,000 mg (1,000 mg Oral Given 12/15/21 1339)   This patient presents as above and evaluation and treatment is begun underway. He does have some nausea as well as fever runny and stuffy nose and suspicion of possible Covid or flu. Appropriate tests ordered as well as some antinausea medication, medication for fever and for body aches. On recheck patient indicates that he is feeling a bit better than he was at first and repeat exam is reassuring. He is informed concerning the tests that came back and does agreed to also the more accurate PCR Covid test which now is pending. Otherwise patient is breathing reasonably easily. He states that he has a history of asthma and we will go ahead and prescribe some albuterol. Otherwise supportive home care including medications prescribed and rest and plenty of fluids. Patient verbalizes understanding and agreement with the above and the following discharge home plan. Home in stable condition to stay hydrated, use medications as discussed, quarantine, and monitor for gradual improvement.  Quit smoking for better health healing and decreased health risks.    Your COVID-19 test-result is likely to return in 2-3 days.  If it is positive you need to continue quarantining at home for at least 10 days from the onset of your symptoms including being at least 24 hours fever free and symptoms improving before returning to normal masked activities such as work/school. If it is negative you may return to normal masked activities such as work/school immediately as tolerated if at least 24 hours fever free and symptoms improving. Call your doctor for further care and treatment 24-72 hours.  Return to ER for difficulty getting air, unable to take liquids, acute mental status change, or other acute worsening or concern. The patient tolerated their visit well. I evaluated the patient. The physician was available for consultation as needed.   The patient and / or the family were informed of the results of any tests, a time was given to answer questions, a plan was proposed and they agreed with plan. CLINICAL IMPRESSION:  1. Non-intractable vomiting with nausea, unspecified vomiting type    2. Viral URI    3. COVID-19 virus test result unknown        DISPOSITION Decision To Discharge 12/15/2021 02:23:03 PM      PATIENT REFERRED TO:  [facility]) Pre-Services  [phone]  Call   for family doctor      DISCHARGE MEDICATIONS:  New Prescriptions    ALBUTEROL SULFATE HFA (VENTOLIN HFA) 108 (90 BASE) MCG/ACT INHALER    Inhale 2 puffs into the lungs 4 times daily as needed for Wheezing    FLUTICASONE (FLONASE) 50 MCG/ACT NASAL SPRAY    2 sprays by Each Nostril route daily for the first 3 days, then decrease to 1 spray in each nostril once daily.     GUAIFENESIN (MUCINEX) 600 MG EXTENDED RELEASE TABLET    Take 1 tablet by mouth 2 times daily for 15 days    ONDANSETRON (ZOFRAN ODT) 4 MG DISINTEGRATING TABLET    Take 1 tablet by mouth every 8 hours as needed for Nausea       DISCONTINUED MEDICATIONS:  Discontinued Medications    No medications on file              (Please note the MDM and HPI sections of this note were completed with a voice recognition program.  Efforts were made to edit the dictations but occasionally words are mis-transcribed.)    Electronically signed, Jay Mendoza PA-C,          Jay Mendoza PA-C  12/15/21 4872

## 2021-12-15 NOTE — Clinical Note
Quentin Ludwig was seen and treated in our emergency department on 12/15/2021. He may return to work on 12/17/2021. COVID-19 test-result is likely to return in 2-3 days. If it is positive you need to continue quarantining at home for at least 10 days from the onset of your symptoms (12-24-21) including being at least 24 hours fever free and symptoms improving before returning to normal masked activities such as work/school. If it is negative you may return to normal masked activities such as work/school immediately as tolerated if at least 24 hours fever free and symptoms improving. If you have any questions or concerns, please don't hesitate to call.       Hamzah Olvera PA-C

## 2021-12-16 LAB — SARS-COV-2: NOT DETECTED

## 2022-09-07 ENCOUNTER — HOSPITAL ENCOUNTER (EMERGENCY)
Age: 32
Discharge: HOME | End: 2022-09-07
Payer: COMMERCIAL

## 2022-09-07 VITALS
OXYGEN SATURATION: 97 % | HEIGHT: 66 IN | HEART RATE: 55 BPM | TEMPERATURE: 97.5 F | DIASTOLIC BLOOD PRESSURE: 73 MMHG | WEIGHT: 146 LBS | SYSTOLIC BLOOD PRESSURE: 132 MMHG | RESPIRATION RATE: 20 BRPM | BODY MASS INDEX: 23.46 KG/M2

## 2022-09-07 DIAGNOSIS — T22.111A SUPERFICIAL BURN OF RIGHT FOREARM, INITIAL ENCOUNTER: Primary | ICD-10-CM

## 2022-09-07 DIAGNOSIS — T23.161A SUPERFICIAL BURN OF BACK OF RIGHT HAND, INITIAL ENCOUNTER: ICD-10-CM

## 2022-09-07 DIAGNOSIS — T23.221A PARTIAL THICKNESS BURN OF SINGLE FINGER OF RIGHT HAND EXCLUDING THUMB, INITIAL ENCOUNTER: ICD-10-CM

## 2022-09-07 DIAGNOSIS — Z72.0 TOBACCO ABUSE: ICD-10-CM

## 2022-09-07 PROCEDURE — 6360000002 HC RX W HCPCS: Performed by: GENERAL ACUTE CARE HOSPITAL

## 2022-09-07 PROCEDURE — 6370000000 HC RX 637 (ALT 250 FOR IP): Performed by: GENERAL ACUTE CARE HOSPITAL

## 2022-09-07 PROCEDURE — 99284 EMERGENCY DEPT VISIT MOD MDM: CPT

## 2022-09-07 PROCEDURE — 96372 THER/PROPH/DIAG INJ SC/IM: CPT

## 2022-09-07 RX ORDER — DIAPER,BRIEF,INFANT-TODD,DISP
EACH MISCELLANEOUS ONCE
Status: COMPLETED | OUTPATIENT
Start: 2022-09-07 | End: 2022-09-07

## 2022-09-07 RX ORDER — PROMETHAZINE HYDROCHLORIDE 25 MG/ML
25 INJECTION, SOLUTION INTRAMUSCULAR; INTRAVENOUS ONCE
Status: DISCONTINUED | OUTPATIENT
Start: 2022-09-07 | End: 2022-09-07 | Stop reason: HOSPADM

## 2022-09-07 RX ORDER — KETOROLAC TROMETHAMINE 30 MG/ML
60 INJECTION, SOLUTION INTRAMUSCULAR; INTRAVENOUS ONCE
Status: COMPLETED | OUTPATIENT
Start: 2022-09-07 | End: 2022-09-07

## 2022-09-07 RX ADMIN — BACITRACIN ZINC: 500 OINTMENT TOPICAL at 19:08

## 2022-09-07 RX ADMIN — BENZOCAINE AND LEVOMENTHOL: 200; 5 SPRAY TOPICAL at 18:53

## 2022-09-07 RX ADMIN — KETOROLAC TROMETHAMINE 60 MG: 60 INJECTION, SOLUTION INTRAMUSCULAR at 18:03

## 2022-09-07 ASSESSMENT — ENCOUNTER SYMPTOMS
NAUSEA: 0
CHEST TIGHTNESS: 0
VOICE CHANGE: 0
VOMITING: 0
SORE THROAT: 0
SHORTNESS OF BREATH: 0
ABDOMINAL PAIN: 0
BACK PAIN: 0
WHEEZING: 0
COLOR CHANGE: 1
COUGH: 0

## 2022-09-07 ASSESSMENT — PAIN DESCRIPTION - LOCATION
LOCATION: ARM
LOCATION: ARM

## 2022-09-07 ASSESSMENT — PAIN DESCRIPTION - ORIENTATION
ORIENTATION: RIGHT
ORIENTATION: RIGHT

## 2022-09-07 ASSESSMENT — PAIN DESCRIPTION - DESCRIPTORS: DESCRIPTORS: BURNING

## 2022-09-07 ASSESSMENT — PAIN - FUNCTIONAL ASSESSMENT: PAIN_FUNCTIONAL_ASSESSMENT: PREVENTS OR INTERFERES SOME ACTIVE ACTIVITIES AND ADLS

## 2022-09-07 ASSESSMENT — PAIN DESCRIPTION - PAIN TYPE: TYPE: ACUTE PAIN

## 2022-09-07 ASSESSMENT — PAIN SCALES - GENERAL
PAINLEVEL_OUTOF10: 10
PAINLEVEL_OUTOF10: 9

## 2022-09-07 NOTE — DISCHARGE INSTRUCTIONS
My cool compresses to the affected area for 20 minutes every 3-4 hours. Take OTC Tylenol or ibuprofen as directed for discomfort. It is important you follow-up with your doctor as directed. You should also follow-up with [facility] wound center in 48 hours for wound recheck.   Return for high fever, incessant vomiting, severe pain, or any other worsening symptoms
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
## 2022-09-07 NOTE — ED NOTES
Discharge and education instructions reviewed. Patient verbalized understanding, teach-back successful. Patient denied questions at this time. No acute distress noted. Patient instructed to follow-up as noted - return to emergency department if symptoms worsen. Patient verbalized understanding. Discharged per EDMD with discharged instructions.        Olga Cuevas RN  09/07/22 7703
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
## 2023-05-16 ENCOUNTER — HOSPITAL ENCOUNTER (EMERGENCY)
Age: 33
Discharge: LEFT BEFORE DISCHARGE | End: 2023-05-17

## 2023-05-17 NOTE — ED NOTES
3 attempts made to call pt back to room. No answer from lobby.       Letitia Wood, LALITO  05/17/23 2972

## 2023-05-17 NOTE — ED NOTES
Followed up with Sukhdev Ro on 5/17/2023 at 2:13 AM. Patient left the ED with a disposition of LWBS before triage on . Patient cited wait time as reason. Advised patient to follow up with a primary care physician or return to the Emergency Department if symptoms worsen.    Alfredo Pollock, LALITO Pollock RN  05/17/23 7849

## 2025-07-02 ENCOUNTER — HOSPITAL ENCOUNTER (EMERGENCY)
Age: 35
Discharge: HOME | End: 2025-07-03
Attending: STUDENT IN AN ORGANIZED HEALTH CARE EDUCATION/TRAINING PROGRAM
Payer: MEDICAID

## 2025-07-02 VITALS
BODY MASS INDEX: 22.42 KG/M2 | DIASTOLIC BLOOD PRESSURE: 92 MMHG | HEART RATE: 88 BPM | SYSTOLIC BLOOD PRESSURE: 166 MMHG | RESPIRATION RATE: 16 BRPM | TEMPERATURE: 99.5 F | OXYGEN SATURATION: 99 % | WEIGHT: 138.89 LBS

## 2025-07-02 DIAGNOSIS — M79.10 MYALGIA: Primary | ICD-10-CM

## 2025-07-02 PROCEDURE — 99283 EMERGENCY DEPT VISIT LOW MDM: CPT

## 2025-07-02 RX ORDER — NAPROXEN 250 MG/1
500 TABLET ORAL ONCE
Status: COMPLETED | OUTPATIENT
Start: 2025-07-03 | End: 2025-07-03

## 2025-07-02 RX ORDER — GABAPENTIN 800 MG/1
800 TABLET ORAL 3 TIMES DAILY
COMMUNITY
Start: 2024-08-28

## 2025-07-02 RX ORDER — PSYLLIUM HUSK 0.4 G
1000 CAPSULE ORAL DAILY
COMMUNITY
Start: 2024-08-28

## 2025-07-02 ASSESSMENT — PAIN - FUNCTIONAL ASSESSMENT
PAIN_FUNCTIONAL_ASSESSMENT: ACTIVITIES ARE NOT PREVENTED
PAIN_FUNCTIONAL_ASSESSMENT: 0-10

## 2025-07-02 ASSESSMENT — PAIN SCALES - GENERAL: PAINLEVEL_OUTOF10: 10

## 2025-07-02 ASSESSMENT — PAIN DESCRIPTION - ORIENTATION: ORIENTATION: RIGHT

## 2025-07-02 ASSESSMENT — PAIN DESCRIPTION - PAIN TYPE: TYPE: ACUTE PAIN

## 2025-07-02 ASSESSMENT — PAIN DESCRIPTION - DESCRIPTORS: DESCRIPTORS: DISCOMFORT

## 2025-07-02 ASSESSMENT — PAIN DESCRIPTION - FREQUENCY: FREQUENCY: CONTINUOUS

## 2025-07-02 ASSESSMENT — PAIN DESCRIPTION - ONSET: ONSET: ON-GOING

## 2025-07-03 ENCOUNTER — APPOINTMENT (OUTPATIENT)
Dept: GENERAL RADIOLOGY | Age: 35
End: 2025-07-03
Payer: MEDICAID

## 2025-07-03 PROCEDURE — 6370000000 HC RX 637 (ALT 250 FOR IP): Performed by: STUDENT IN AN ORGANIZED HEALTH CARE EDUCATION/TRAINING PROGRAM

## 2025-07-03 PROCEDURE — 71045 X-RAY EXAM CHEST 1 VIEW: CPT

## 2025-07-03 RX ORDER — CYCLOBENZAPRINE HCL 10 MG
10 TABLET ORAL 2 TIMES DAILY PRN
Qty: 10 TABLET | Refills: 0 | Status: SHIPPED | OUTPATIENT
Start: 2025-07-03 | End: 2025-07-08

## 2025-07-03 RX ORDER — NAPROXEN 250 MG/1
250 TABLET ORAL 2 TIMES DAILY WITH MEALS
Qty: 30 TABLET | Refills: 0 | Status: SHIPPED | OUTPATIENT
Start: 2025-07-03 | End: 2025-07-05

## 2025-07-03 RX ADMIN — NAPROXEN SODIUM 500 MG: 250 TABLET ORAL at 00:05

## 2025-07-03 ASSESSMENT — PAIN SCALES - GENERAL
PAINLEVEL_OUTOF10: 10
PAINLEVEL_OUTOF10: 3

## 2025-07-03 NOTE — DISCHARGE INSTRUCTIONS
You were seen today in the emergency department due to body aches.  Your x-ray did not show any acute abnormalities and you were given naproxen here in the emergency department.  Please note a prescription for naproxen as well as a muscle laxer has been sent to your pharmacy, please take it as prescribed.  Please turn to the emergency department if you experience any further concerning symptoms.
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
## 2025-07-05 ENCOUNTER — APPOINTMENT (OUTPATIENT)
Dept: CT IMAGING | Age: 35
End: 2025-07-05
Payer: MEDICAID

## 2025-07-05 ENCOUNTER — HOSPITAL ENCOUNTER (EMERGENCY)
Age: 35
Discharge: HOME | End: 2025-07-05
Attending: EMERGENCY MEDICINE
Payer: MEDICAID

## 2025-07-05 VITALS
RESPIRATION RATE: 15 BRPM | WEIGHT: 141.76 LBS | DIASTOLIC BLOOD PRESSURE: 66 MMHG | HEART RATE: 91 BPM | TEMPERATURE: 99.3 F | HEIGHT: 66 IN | OXYGEN SATURATION: 100 % | SYSTOLIC BLOOD PRESSURE: 120 MMHG | BODY MASS INDEX: 22.78 KG/M2

## 2025-07-05 DIAGNOSIS — L03.116 CELLULITIS OF LEFT LEG: Primary | ICD-10-CM

## 2025-07-05 LAB
ALBUMIN SERPL-MCNC: 3.5 G/DL (ref 3.4–5)
ALBUMIN/GLOB SERPL: 1.1 {RATIO} (ref 1.1–2.2)
ALP SERPL-CCNC: 96 U/L (ref 40–129)
ALT SERPL-CCNC: 50 U/L (ref 10–40)
ANION GAP SERPL CALCULATED.3IONS-SCNC: 11 MMOL/L (ref 3–16)
AST SERPL-CCNC: 66 U/L (ref 15–37)
BASOPHILS # BLD: 0 K/UL (ref 0–0.2)
BASOPHILS NFR BLD: 0.3 %
BILIRUB SERPL-MCNC: 0.4 MG/DL (ref 0–1)
BUN SERPL-MCNC: 13 MG/DL (ref 7–20)
CALCIUM SERPL-MCNC: 8 MG/DL (ref 8.3–10.6)
CHLORIDE SERPL-SCNC: 99 MMOL/L (ref 99–110)
CO2 SERPL-SCNC: 24 MMOL/L (ref 21–32)
CREAT SERPL-MCNC: 0.7 MG/DL (ref 0.9–1.3)
DEPRECATED RDW RBC AUTO: 14.6 % (ref 12.4–15.4)
EOSINOPHIL # BLD: 0 K/UL (ref 0–0.6)
EOSINOPHIL NFR BLD: 0 %
GFR SERPLBLD CREATININE-BSD FMLA CKD-EPI: >90 ML/MIN/{1.73_M2}
GLUCOSE SERPL-MCNC: 104 MG/DL (ref 70–99)
HCT VFR BLD AUTO: 33.6 % (ref 40.5–52.5)
HGB BLD-MCNC: 11.9 G/DL (ref 13.5–17.5)
LACTATE BLDV-SCNC: 1.1 MMOL/L (ref 0.4–1.9)
LYMPHOCYTES # BLD: 0.7 K/UL (ref 1–5.1)
LYMPHOCYTES NFR BLD: 7.7 %
MCH RBC QN AUTO: 30.2 PG (ref 26–34)
MCHC RBC AUTO-ENTMCNC: 35.4 G/DL (ref 31–36)
MCV RBC AUTO: 85.2 FL (ref 80–100)
MONOCYTES # BLD: 0.9 K/UL (ref 0–1.3)
MONOCYTES NFR BLD: 9.6 %
NEUTROPHILS # BLD: 7.5 K/UL (ref 1.7–7.7)
NEUTROPHILS NFR BLD: 82.4 %
PLATELET # BLD AUTO: 114 K/UL (ref 135–450)
PMV BLD AUTO: 8.5 FL (ref 5–10.5)
POTASSIUM SERPL-SCNC: 3.6 MMOL/L (ref 3.5–5.1)
PROT SERPL-MCNC: 6.6 G/DL (ref 6.4–8.2)
RBC # BLD AUTO: 3.95 M/UL (ref 4.2–5.9)
SODIUM SERPL-SCNC: 134 MMOL/L (ref 136–145)
WBC # BLD AUTO: 9.1 K/UL (ref 4–11)

## 2025-07-05 PROCEDURE — 86140 C-REACTIVE PROTEIN: CPT

## 2025-07-05 PROCEDURE — 83605 ASSAY OF LACTIC ACID: CPT

## 2025-07-05 PROCEDURE — 85025 COMPLETE CBC W/AUTO DIFF WBC: CPT

## 2025-07-05 PROCEDURE — 6360000002 HC RX W HCPCS: Performed by: PHYSICIAN ASSISTANT

## 2025-07-05 PROCEDURE — 73701 CT LOWER EXTREMITY W/DYE: CPT

## 2025-07-05 PROCEDURE — 6360000004 HC RX CONTRAST MEDICATION: Performed by: PHYSICIAN ASSISTANT

## 2025-07-05 PROCEDURE — 96366 THER/PROPH/DIAG IV INF ADDON: CPT

## 2025-07-05 PROCEDURE — 96365 THER/PROPH/DIAG IV INF INIT: CPT

## 2025-07-05 PROCEDURE — 96367 TX/PROPH/DG ADDL SEQ IV INF: CPT

## 2025-07-05 PROCEDURE — 99285 EMERGENCY DEPT VISIT HI MDM: CPT

## 2025-07-05 PROCEDURE — 80053 COMPREHEN METABOLIC PANEL: CPT

## 2025-07-05 PROCEDURE — 87040 BLOOD CULTURE FOR BACTERIA: CPT

## 2025-07-05 PROCEDURE — 2580000003 HC RX 258: Performed by: PHYSICIAN ASSISTANT

## 2025-07-05 PROCEDURE — 87150 DNA/RNA AMPLIFIED PROBE: CPT

## 2025-07-05 PROCEDURE — 36415 COLL VENOUS BLD VENIPUNCTURE: CPT

## 2025-07-05 RX ORDER — NAPROXEN 500 MG/1
500 TABLET ORAL 2 TIMES DAILY WITH MEALS
Qty: 60 TABLET | Refills: 0 | Status: SHIPPED | OUTPATIENT
Start: 2025-07-05 | End: 2025-08-04

## 2025-07-05 RX ORDER — IOPAMIDOL 755 MG/ML
100 INJECTION, SOLUTION INTRAVASCULAR
Status: COMPLETED | OUTPATIENT
Start: 2025-07-05 | End: 2025-07-05

## 2025-07-05 RX ORDER — SULFAMETHOXAZOLE AND TRIMETHOPRIM 800; 160 MG/1; MG/1
1 TABLET ORAL 2 TIMES DAILY
Qty: 14 TABLET | Refills: 0 | Status: SHIPPED | OUTPATIENT
Start: 2025-07-05 | End: 2025-07-05

## 2025-07-05 RX ORDER — CEPHALEXIN 500 MG/1
500 CAPSULE ORAL 4 TIMES DAILY
Qty: 28 CAPSULE | Refills: 0 | Status: SHIPPED | OUTPATIENT
Start: 2025-07-05 | End: 2025-07-05

## 2025-07-05 RX ORDER — CEPHALEXIN 500 MG/1
500 CAPSULE ORAL 4 TIMES DAILY
Qty: 28 CAPSULE | Refills: 0 | Status: SHIPPED | OUTPATIENT
Start: 2025-07-05 | End: 2025-07-12

## 2025-07-05 RX ORDER — NAPROXEN 500 MG/1
500 TABLET ORAL 2 TIMES DAILY WITH MEALS
Qty: 60 TABLET | Refills: 0 | Status: SHIPPED | OUTPATIENT
Start: 2025-07-05 | End: 2025-07-05

## 2025-07-05 RX ORDER — ACETAMINOPHEN 500 MG
1000 TABLET ORAL EVERY 8 HOURS PRN
Qty: 60 TABLET | Refills: 0 | Status: SHIPPED | OUTPATIENT
Start: 2025-07-05 | End: 2025-07-05

## 2025-07-05 RX ORDER — VANCOMYCIN 1.5 G/300ML
25 INJECTION, SOLUTION INTRAVENOUS ONCE
Status: COMPLETED | OUTPATIENT
Start: 2025-07-05 | End: 2025-07-05

## 2025-07-05 RX ORDER — TRAZODONE HYDROCHLORIDE 100 MG/1
100 TABLET ORAL NIGHTLY
COMMUNITY

## 2025-07-05 RX ORDER — ACETAMINOPHEN 500 MG
1000 TABLET ORAL EVERY 8 HOURS PRN
Qty: 60 TABLET | Refills: 0 | Status: SHIPPED | OUTPATIENT
Start: 2025-07-05 | End: 2025-07-15

## 2025-07-05 RX ORDER — SULFAMETHOXAZOLE AND TRIMETHOPRIM 800; 160 MG/1; MG/1
1 TABLET ORAL 2 TIMES DAILY
Qty: 14 TABLET | Refills: 0 | Status: SHIPPED | OUTPATIENT
Start: 2025-07-05 | End: 2025-07-12

## 2025-07-05 RX ADMIN — CEFEPIME 2000 MG: 2 INJECTION, POWDER, FOR SOLUTION INTRAVENOUS at 17:46

## 2025-07-05 RX ADMIN — VANCOMYCIN 1500 MG: 1.5 INJECTION, SOLUTION INTRAVENOUS at 18:36

## 2025-07-05 RX ADMIN — IOPAMIDOL 100 ML: 755 INJECTION, SOLUTION INTRAVENOUS at 18:26

## 2025-07-05 ASSESSMENT — PAIN DESCRIPTION - LOCATION: LOCATION: HIP

## 2025-07-05 ASSESSMENT — PAIN - FUNCTIONAL ASSESSMENT
PAIN_FUNCTIONAL_ASSESSMENT: 0-10
PAIN_FUNCTIONAL_ASSESSMENT: NONE - DENIES PAIN

## 2025-07-05 ASSESSMENT — PAIN SCALES - GENERAL: PAINLEVEL_OUTOF10: 9

## 2025-07-05 ASSESSMENT — PAIN DESCRIPTION - ORIENTATION: ORIENTATION: LEFT

## 2025-07-05 NOTE — CONSULTS
Clinical Pharmacy Note  Vancomycin Consult    Pharmacy consult received for one-time dose of vancomycin in the Emergency Department per Aleshia Sanchez PA-C.    Ht Readings from Last 1 Encounters:   07/05/25 1.676 m (5' 6\")        Wt Readings from Last 1 Encounters:   07/05/25 64.3 kg (141 lb 12.1 oz)         Assessment/Plan:  Vancomycin 1500 mg x 1 in ED.  If vancomycin is to continue on admission and pharmacy is to manage dosing, please re-consult with admission orders.    Macey Patrick RPH, PharmD 7/5/2025 5:07 PM
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
## 2025-07-05 NOTE — ED PROVIDER NOTES
I independently examined and evaluated Edwardo Holcomb. In brief their history revealed patient here with redness and swelling to the left hip.  He states this started a few days ago.  No fever, chills, nausea, vomiting.  He states he last used IV drugs about 3 years ago and is never injected into his legs.  He last snorted drugs about 3 days ago.  He does have HIV but states he takes his medication as directed.. Their exam revealed redness and erythema to the left lateral hip with some slight induration.  Significantly tender to palpation. All diagnostic, treatment, and disposition decisions were made by myself in conjunction with the mid-level provider. Before disposition, questions were sought and answered with the patient. They have voiced understanding and agree with the plan.     For all further details of the patient's emergency department visit, please see the mid-level practitioner's documentation.        Nevaeh Trevino MD  07/06/25 0712

## 2025-07-06 LAB — CRP SERPL-MCNC: 18.7 MG/L (ref 0–5.1)

## 2025-07-07 ENCOUNTER — RESULTS FOLLOW-UP (OUTPATIENT)
Dept: EMERGENCY DEPT | Age: 35
End: 2025-07-07

## 2025-07-07 LAB — REPORT: NORMAL

## 2025-07-07 NOTE — ED NOTES
07/9/25 0746    Dorie from [facility] lab called  Estephanie Jordan positive blood culture streptococcus species. Informed Current ED doctor Dr. Hagan

## 2025-07-10 LAB
BACTERIA BLD CULT: ABNORMAL
ORGANISM: ABNORMAL

## (undated) DEVICE — TUBING, SUCTION, 1/4" X 12', STRAIGHT: Brand: MEDLINE

## (undated) DEVICE — SYRINGE IRRIG 60ML SFT PLIABLE BLB EZ TO GRP 1 HND USE W/

## (undated) DEVICE — ANTISEPTIC 1OZ POVIDONE IOD SOL PKT

## (undated) DEVICE — ZIMMER® STERILE DISPOSABLE TOURNIQUET CUFF WITH PLC, DUAL PORT, SINGLE BLADDER, 18 IN. (46 CM)

## (undated) DEVICE — SPONGE GZ W4XL4IN COT 12 PLY TYP VII WVN C FLD DSGN

## (undated) DEVICE — GLOVE ORANGE PI 8 1/2   MSG9085

## (undated) DEVICE — DRAPE,U/SHT,SPLIT,FILM,60X84,STERILE: Brand: MEDLINE

## (undated) DEVICE — ELECTROSURGICAL PENCIL BUTTON SWITCH NON COATED BLADE ELECTRODE 10 FT (3 M) CORD HOLSTER: Brand: MEGADYNE

## (undated) DEVICE — ELECTRODE PT RET AD L9FT HI MOIST COND ADH HYDRGEL CORDED

## (undated) DEVICE — SOLUTION IV IRRIG POUR BRL 0.9% SODIUM CHL 2F7124

## (undated) DEVICE — Z CONVERTED USE 2271377 BANDAGE COMPR W6INXL5YD EC E REB

## (undated) DEVICE — SPONGE LAP W18XL18IN WHT COT 4 PLY FLD STRUNG RADPQ DISP ST

## (undated) DEVICE — DRESSING,GAUZE,XEROFORM,CURAD,1"X8",ST: Brand: CURAD

## (undated) DEVICE — GLOVE SURG SZ 85 L12IN FNGR THK87MIL WHT LTX FREE

## (undated) DEVICE — KIT OR ROOM TURNOVER W/STRAP

## (undated) DEVICE — CANISTER, RIGID, 1200CC: Brand: MEDLINE INDUSTRIES, INC.

## (undated) DEVICE — COVER LT HNDL BLU PLAS

## (undated) DEVICE — DRAPE HND W114XL142IN BLU POLYPR W O PCH FEN CRD AND TB HLDR

## (undated) DEVICE — SHEET,DRAPE,53X77,STERILE: Brand: MEDLINE